# Patient Record
Sex: MALE | Race: WHITE | NOT HISPANIC OR LATINO | Employment: STUDENT | URBAN - METROPOLITAN AREA
[De-identification: names, ages, dates, MRNs, and addresses within clinical notes are randomized per-mention and may not be internally consistent; named-entity substitution may affect disease eponyms.]

---
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
## 2018-03-30 ENCOUNTER — OFFICE VISIT (OUTPATIENT)
Dept: FAMILY MEDICINE | Facility: OTHER | Age: 18
End: 2018-03-30
Payer: COMMERCIAL

## 2018-03-30 VITALS
WEIGHT: 175 LBS | HEIGHT: 74 IN | RESPIRATION RATE: 20 BRPM | SYSTOLIC BLOOD PRESSURE: 104 MMHG | OXYGEN SATURATION: 96 % | DIASTOLIC BLOOD PRESSURE: 76 MMHG | BODY MASS INDEX: 22.46 KG/M2 | TEMPERATURE: 98.2 F | HEART RATE: 100 BPM

## 2018-03-30 DIAGNOSIS — J01.90 ACUTE SINUSITIS WITH SYMPTOMS > 10 DAYS: Primary | ICD-10-CM

## 2018-03-30 PROCEDURE — 99213 OFFICE O/P EST LOW 20 MIN: CPT | Performed by: PHYSICIAN ASSISTANT

## 2018-03-30 RX ORDER — AMOXICILLIN 875 MG
875 TABLET ORAL 2 TIMES DAILY
Qty: 20 TABLET | Refills: 0 | Status: SHIPPED | OUTPATIENT
Start: 2018-03-30 | End: 2018-04-02 | Stop reason: ALTCHOICE

## 2018-03-30 ASSESSMENT — PAIN SCALES - GENERAL: PAINLEVEL: MILD PAIN (2)

## 2018-03-30 NOTE — MR AVS SNAPSHOT
After Visit Summary   3/30/2018    Guillermo Harris    MRN: [id number]           Patient Information     Date Of Birth          2000        Visit Information        Provider Department      3/30/2018 9:15 AM Lorraine Mendez PA-C [facility]        Today's Diagnoses     Acute sinusitis with symptoms > 10 days    -  1      Care Instructions    - Start Amoxicillin twice a day for 10 days   - Recommend - Sudafed or Nasal                     Sinusitis           What is sinusitis?   Sinusitis is swollen, infected linings of the sinuses. The sinuses are hollow spaces in the bones of your face and skull. They connect with the nose through small openings. Like the nose, their linings make mucus.   How does it occur?   Sinusitis occurs when the sinus linings become infected. The passageways from the sinuses to the nose are very narrow. Swelling and mucus may block the passageways. This leads to pressure changes in the sinuses that can be painful.   A number of things can cause swelling and sinusitis. Most often it's allergens (things that cause allergies, like pollen and mold) and viruses, such as viruses that cause the common cold. Whether the cause is allergies or a virus, the sinus linings can swell. When swelling causes the sinus passageway to swell shut, bacteria, viruses, and even fungus can be trapped in the sinuses and cause a sinus infection.   If your nasal bones have been injured or are deformed, causing partial blockage of the sinus openings, you are more likely to get sinusitis.   What are the symptoms?   Symptoms include:   feeling of fullness or pressure in your head   a headache that is most painful when you first wake up in the morning or when you bend your head down or forward   pain above or below your eyes   aching in the upper jaw and teeth   runny or stuffy nose   cough, especially at night   fluid draining down the back of your throat (postnasal  drainage)   sore throat in the morning or evening.   How is it diagnosed?   Your healthcare provider will ask about your symptoms and will examine you. You may have an X-ray to look for swelling, fluid, or small benign growths (polyps) in the sinuses.   How is it treated?   Decongestants may help. They may be nonprescription or prescription. They are available as liquids, pills, and nose sprays.   Your healthcare provider may prescribe an antibiotic. In some cases you may need to take decongestants and antibiotics for several weeks.   You may need nonprescription medicine for pain, such as acetaminophen or ibuprofen. Check with your healthcare provider before you give any medicine that contains aspirin or salicylates to a child or teen. This includes medicines like baby aspirin, some cold medicines, and Pepto Bismol. Children and teens who take aspirin are at risk for a serious illness called Reye's syndrome. Ibuprofen is an NSAID. Nonsteroidal anti-inflammatory medicines (NSAIDs) may cause stomach bleeding and other problems. These risks increase with age. Read the label and take as directed. Unless recommended by your healthcare provider, do not take NSAIDs for more than 10 days for any reason.   If you have chronic or repeated sinus infections, allergies may be the cause. Your healthcare provider may prescribe antihistamine tablets or prescription nasal sprays (steroids or cromolyn) to treat the allergies.   If you have chronic, severe sinusitis that does not respond to treatment with medicines, surgery may be done. The surgeon can create an extra or enlarged passageway in the wall of the sinus cavity. This allows the sinuses to drain more easily through the nasal passages. This should help them stay free of infection.   How long will the effects last?   Symptoms may get better gradually over 3 to 10 days. Depending on what caused the sinusitis and how severe it is, it may last for days or weeks. The symptoms may  come back if you do not finish all of your antibiotic.   How can I take care of myself?   Follow your healthcare provider's instructions.   If you are taking an antibiotic, take all of it as directed by your provider. If you stop taking the medicine when your symptoms are gone but before you have taken all of the medicine, symptoms may come back.   Avoid tobacco smoke.   If you have allergies, take care to avoid the things you are allergic to, such as animal dander.   Add moisture to the air with a humidifier or a vaporizer, unless you have mold allergy (mold may grow in your vaporizer).   Inhale steam from a basin of hot water or shower to help open your sinuses and relieve pain.   Use saline nasal sprays to help wash out nasal passages and clear some mucus from the airways.   Use decongestants as directed on the label or by your provider.   If you are using a nonprescription nasal-spray decongestant, generally you should not use it for more than 3 days. After 3 days it may cause your symptoms to get worse. Ask your healthcare provider if it is OK for you to use a nasal spray decongestant longer than this.   Get plenty of rest.   Drink more fluids to keep the mucus as thin as possible so your sinuses can drain more easily.   Put warm compresses on painful areas.   Take antibiotics as prescribed. Use all of the medicine, even after you feel better. Some sinus infections require 2 to 4 weeks of antibiotic treatment.   See your healthcare provider if the pain lasts for several days or gets worse.   If the sinus areas above or below your eyes are swollen or bulging, see your healthcare provider right away. This symptom may mean that the infection is spreading. A spreading infection can affect other parts of your body--even the brain--and needs to be treated promptly.   How can I help prevent sinusitis?   Treat your colds and allergies promptly. Use decongestants as soon as you start having symptoms.   Do not smoke and  stay away from secondhand smoke.   Drink lots of fluids to keep the mucus thin.   Humidify your home if the air is particularly dry.   If you have sinus infections often, consider having allergy tests.   If sinusitis continues to be a problem despite treatment, you might need an exam by an ear, nose, and throat doctor (called an ENT or otolaryngologist). The specialist will check for polyps or a deformed bone that may be blocking your sinuses.     Published by "[employer].  This content is reviewed periodically and is subject to change as new health information becomes available. The information is intended to inform and educate and is not a replacement for medical evaluation, advice, diagnosis or treatment by a healthcare professional.   Developed by "[employer].   ? 2010 "[employer] and/or its affiliates. All Rights Reserved.   Copyright   Clinical Reference Systems 2011  Adult Health Advisor                      Follow-ups after your visit        Follow-up notes from your care team     Return if symptoms worsen or fail to improve.      Who to contact     If you have questions or need follow up information about today's clinic visit or your schedule please contact [facility] directly at [phone].  Normal or non-critical lab and imaging results will be communicated to you by [employer], letter or phone within 4 business days after the clinic has received the results. If you do not hear from us within 7 days, please contact the clinic through [employer] or phone. If you have a critical or abnormal lab result, we will notify you by phone as soon as possible.  Submit refill requests through [employer] or call your pharmacy and they will forward the refill request to us. Please allow 3 business days for your refill to be completed.          Additional Information About Your Visit        [employer] Information     [employer] gives you secure access to your electronic health record. If you see a primary care  "provider, you can also send messages to your care team and make appointments. If you have questions, please call your primary care clinic.  If you do not have a primary care provider, please call [phone] and they will assist you.        Care EveryWhere ID     This is your Care EveryWhere ID. This could be used by other organizations to access your [facility] medical records  [id number]        Your Vitals Were     Pulse Temperature Respirations Height Pulse Oximetry BMI (Body Mass Index)    100 98.2  F (36.8  C) (Oral) 20 6' 2.09\" (1.882 m) 96% 22.41 kg/m2       Blood Pressure from Last 3 Encounters:   03/30/18 104/76   03/24/17 108/78   03/03/17 100/62    Weight from Last 3 Encounters:   03/30/18 175 lb (79.4 kg) (77 %)*   03/24/17 168 lb 8 oz (76.4 kg) (75 %)*   03/03/17 172 lb 8 oz (78.2 kg) (79 %)*     * Growth percentiles are based on [facility] 2-20 Years data.              Today, you had the following     No orders found for display         Today's Medication Changes          These changes are accurate as of 3/30/18  9:50 AM.  If you have any questions, ask your nurse or doctor.               Start taking these medicines.        Dose/Directions    amoxicillin 875 MG tablet   Commonly known as:  AMOXIL   Used for:  Acute sinusitis with symptoms > 10 days   Started by:  Lorraine Mendez PA-C        Dose:  875 mg   Take 1 tablet (875 mg) by mouth 2 times daily   Quantity:  20 tablet   Refills:  0            Where to get your medicines      These medications were [place] [facility] [place] - [address]  [address] , [facility] [address]     Phone:  [phone]     amoxicillin 875 MG tablet                Primary Care Provider Office Phone # Fax #    Stephie Lowry [phone]       [address]  [facility] [address]        Equal Access to Services     CRISTI RUELAS AH: Hadrg Curiel, waaxda luqadaha, qaybta conniealjazmin de leon, " krystina willsvicki cast'aan ah. So [facility] [phone].    ATENCIÓN: Si habla rivka, tiene a frank disposición servicios gratuitos de asistencia lingüística. Austen al [phone].    We comply with applicable federal civil rights laws and [place] laws. We do not discriminate on the basis of race, color, national origin, age, disability, sex, sexual orientation, or gender identity.            Thank you!     Thank you for choosing [facility]  for your care. Our goal is always to provide you with excellent care. Hearing back from our patients is one way we can continue to improve our services. Please take a few minutes to complete the written survey that you may receive in the mail after your visit with us. Thank you!             Your Updated Medication List - Protect others around you: Learn how to safely use, store and throw away your medicines at www.disposemymeds.org.          This list is accurate as of 3/30/18  9:50 AM.  Always use your most recent med list.                   Brand Name Dispense Instructions for use Diagnosis    amoxicillin 875 MG tablet    AMOXIL    20 tablet    Take 1 tablet (875 mg) by mouth 2 times daily    Acute sinusitis with symptoms > 10 days

## 2018-03-30 NOTE — PATIENT INSTRUCTIONS
- Start Amoxicillin twice a day for 10 days   - Recommend - Sudafed or Nasal                     Sinusitis           What is sinusitis?   Sinusitis is swollen, infected linings of the sinuses. The sinuses are hollow spaces in the bones of your face and skull. They connect with the nose through small openings. Like the nose, their linings make mucus.   How does it occur?   Sinusitis occurs when the sinus linings become infected. The passageways from the sinuses to the nose are very narrow. Swelling and mucus may block the passageways. This leads to pressure changes in the sinuses that can be painful.   A number of things can cause swelling and sinusitis. Most often it's allergens (things that cause allergies, like pollen and mold) and viruses, such as viruses that cause the common cold. Whether the cause is allergies or a virus, the sinus linings can swell. When swelling causes the sinus passageway to swell shut, bacteria, viruses, and even fungus can be trapped in the sinuses and cause a sinus infection.   If your nasal bones have been injured or are deformed, causing partial blockage of the sinus openings, you are more likely to get sinusitis.   What are the symptoms?   Symptoms include:   feeling of fullness or pressure in your head   a headache that is most painful when you first wake up in the morning or when you bend your head down or forward   pain above or below your eyes   aching in the upper jaw and teeth   runny or stuffy nose   cough, especially at night   fluid draining down the back of your throat (postnasal drainage)   sore throat in the morning or evening.   How is it diagnosed?   Your healthcare provider will ask about your symptoms and will examine you. You may have an X-ray to look for swelling, fluid, or small benign growths (polyps) in the sinuses.   How is it treated?   Decongestants may help. They may be nonprescription or prescription. They are available as liquids, pills, and nose sprays.    Your healthcare provider may prescribe an antibiotic. In some cases you may need to take decongestants and antibiotics for several weeks.   You may need nonprescription medicine for pain, such as acetaminophen or ibuprofen. Check with your healthcare provider before you give any medicine that contains aspirin or salicylates to a child or teen. This includes medicines like baby aspirin, some cold medicines, and Pepto Bismol. Children and teens who take aspirin are at risk for a serious illness called Reye's syndrome. Ibuprofen is an NSAID. Nonsteroidal anti-inflammatory medicines (NSAIDs) may cause stomach bleeding and other problems. These risks increase with age. Read the label and take as directed. Unless recommended by your healthcare provider, do not take NSAIDs for more than 10 days for any reason.   If you have chronic or repeated sinus infections, allergies may be the cause. Your healthcare provider may prescribe antihistamine tablets or prescription nasal sprays (steroids or cromolyn) to treat the allergies.   If you have chronic, severe sinusitis that does not respond to treatment with medicines, surgery may be done. The surgeon can create an extra or enlarged passageway in the wall of the sinus cavity. This allows the sinuses to drain more easily through the nasal passages. This should help them stay free of infection.   How long will the effects last?   Symptoms may get better gradually over 3 to 10 days. Depending on what caused the sinusitis and how severe it is, it may last for days or weeks. The symptoms may come back if you do not finish all of your antibiotic.   How can I take care of myself?   Follow your healthcare provider's instructions.   If you are taking an antibiotic, take all of it as directed by your provider. If you stop taking the medicine when your symptoms are gone but before you have taken all of the medicine, symptoms may come back.   Avoid tobacco smoke.   If you have allergies,  take care to avoid the things you are allergic to, such as animal dander.   Add moisture to the air with a humidifier or a vaporizer, unless you have mold allergy (mold may grow in your vaporizer).   Inhale steam from a basin of hot water or shower to help open your sinuses and relieve pain.   Use saline nasal sprays to help wash out nasal passages and clear some mucus from the airways.   Use decongestants as directed on the label or by your provider.   If you are using a nonprescription nasal-spray decongestant, generally you should not use it for more than 3 days. After 3 days it may cause your symptoms to get worse. Ask your healthcare provider if it is OK for you to use a nasal spray decongestant longer than this.   Get plenty of rest.   Drink more fluids to keep the mucus as thin as possible so your sinuses can drain more easily.   Put warm compresses on painful areas.   Take antibiotics as prescribed. Use all of the medicine, even after you feel better. Some sinus infections require 2 to 4 weeks of antibiotic treatment.   See your healthcare provider if the pain lasts for several days or gets worse.   If the sinus areas above or below your eyes are swollen or bulging, see your healthcare provider right away. This symptom may mean that the infection is spreading. A spreading infection can affect other parts of your body--even the brain--and needs to be treated promptly.   How can I help prevent sinusitis?   Treat your colds and allergies promptly. Use decongestants as soon as you start having symptoms.   Do not smoke and stay away from secondhand smoke.   Drink lots of fluids to keep the mucus thin.   Humidify your home if the air is particularly dry.   If you have sinus infections often, consider having allergy tests.   If sinusitis continues to be a problem despite treatment, you might need an exam by an ear, nose, and throat doctor (called an ENT or otolaryngologist). The specialist will check for polyps or  a deformed bone that may be blocking your sinuses.     Published by [employer].  This content is reviewed periodically and is subject to change as new health information becomes available. The information is intended to inform and educate and is not a replacement for medical evaluation, advice, diagnosis or treatment by a healthcare professional.   Developed by [employer].   ? 2010 [facility] and/or its affiliates. All Rights Reserved.   Copyright   Clinical Reference Systems 2011  Adult Health Advisor

## 2018-03-30 NOTE — PROGRESS NOTES
"  SUBJECTIVE:   Guillermo Harris is a 17 year old male who presents to clinic today for the following health issues:    HPI  Acute Illness   Acute illness concerns: cough/sinus  Onset: 1+ week    Fever: no    Chills/Sweats: YES- sweats    Headache (location?): YES    Sinus Pressure:YES    Conjunctivitis:  no    Ear Pain: no    Rhinorrhea: YES    Congestion: YES    Sore Throat: YES- slight     Cough: YES - mucous     Wheeze: no    Decreased Appetite: YES    Nausea: no    Vomiting: YES- from coughing     Diarrhea:  no    Dysuria/Freq.: no    Fatigue/Achiness: YES    Sick/Strep Exposure: no     Therapies Tried and outcome: mucinex, nyquil - not helpful       Problem list and histories reviewed & adjusted, as indicated.  Additional history: as documented    ROS:  Constitutional, HEENT, cardiovascular, pulmonary, gi and gu systems are negative, except as otherwise noted.    OBJECTIVE:   /76 (BP Location: Right arm, Patient Position: Chair, Cuff Size: Adult Regular)  Pulse 100  Temp 98.2  F (36.8  C) (Oral)  Resp 20  Ht 6' 2.09\" (1.882 m)  Wt 175 lb (79.4 kg)  SpO2 96%  BMI 22.41 kg/m2  Body mass index is 22.41 kg/(m^2).  GENERAL APPEARANCE: ill appearing, alert and no distress  EYES: Eyes grossly normal to inspection, PERRLA, conjunctivae and sclerae without injection or discharge, EOM intact   HENT: Bilateral ear canals without erythema or cerumen, bilateral TM's with clear serous effusion and mild injection, no bulging, nasal turbinates with severe swelling & erythema, yellow-green nasal discharge, mouth without ulcers or lesions, oropharynx mildly erythematous without edema or exudate, oral mucous membranes moist, severe bilateral frontal sinus tenderness, mild-moderate bilateral maxillary sinus tenderness  NECK: Bilateral anterior cervical adenopathy, no adenopathy in cervical or supraclavicular regions  RESP: Lungs clear to auscultation - no rales, rhonchi or wheezes   CV: Regular rates and rhythm, " normal S1 S2, no S3 or S4, no murmur, click or rub  MS: No musculoskeletal defects are noted and gait is age appropriate without ataxia   SKIN: No suspicious lesions or rashes, hydration status appears adeuqate with normal skin turgor   PSYCH: Alert and oriented x3; speech- coherent , normal rate and volume; able to articulate logical thoughts, able to abstract reason, no tangential thoughts, no hallucinations or delusions, mentation appears normal, Mood is euthymic. Affect is appropriate for this mood state and bright. Thought content is free of suicidal ideation, hallucinations, and delusions. Dress is adequate and upkept. Eye contact is good during conversation.       Diagnostic Test Results:  none     ASSESSMENT/PLAN:       ICD-10-CM    1. Acute sinusitis with symptoms > 10 days J01.90 amoxicillin (AMOXIL) 875 MG tablet     - Due to severity of symptoms and duration, recommend treatment  - Discussed antibiotic use, duration, and side effects  - Recommend Sudafed, nasal saline, rest, and fluids   - Hand out given    The patient indicates understanding of these issues and agrees with the plan.    Follow up: ANNETTE Mendez PA-C  [facility]

## 2018-03-30 NOTE — NURSING NOTE
"Chief Complaint   Patient presents with     Cough       Initial /76 (BP Location: Right arm, Patient Position: Chair, Cuff Size: Adult Regular)  Pulse 100  Temp 98.2  F (36.8  C) (Oral)  Resp 20  Ht 6' 2.09\" (1.882 m)  Wt 175 lb (79.4 kg)  SpO2 96%  BMI 22.41 kg/m2 Estimated body mass index is 22.41 kg/(m^2) as calculated from the following:    Height as of this encounter: 6' 2.09\" (1.882 m).    Weight as of this encounter: 175 lb (79.4 kg).  Medication Reconciliation: complete  "

## 2018-04-02 ENCOUNTER — OFFICE VISIT (OUTPATIENT)
Dept: FAMILY MEDICINE | Facility: OTHER | Age: 18
End: 2018-04-02
Payer: COMMERCIAL

## 2018-04-02 VITALS
SYSTOLIC BLOOD PRESSURE: 100 MMHG | WEIGHT: 175 LBS | HEART RATE: 92 BPM | OXYGEN SATURATION: 94 % | BODY MASS INDEX: 22.41 KG/M2 | TEMPERATURE: 98.4 F | RESPIRATION RATE: 16 BRPM | DIASTOLIC BLOOD PRESSURE: 78 MMHG

## 2018-04-02 DIAGNOSIS — H66.001 ACUTE SUPPURATIVE OTITIS MEDIA OF RIGHT EAR WITHOUT SPONTANEOUS RUPTURE OF TYMPANIC MEMBRANE, RECURRENCE NOT SPECIFIED: Primary | ICD-10-CM

## 2018-04-02 PROCEDURE — 99213 OFFICE O/P EST LOW 20 MIN: CPT | Performed by: PHYSICIAN ASSISTANT

## 2018-04-02 RX ORDER — PSEUDOEPHEDRINE HCL 120 MG/1
120 TABLET, FILM COATED, EXTENDED RELEASE ORAL EVERY 12 HOURS
Qty: 28 TABLET | Refills: 0 | Status: SHIPPED | OUTPATIENT
Start: 2018-04-02 | End: 2020-12-16

## 2018-04-02 NOTE — MR AVS SNAPSHOT
After Visit Summary   4/2/2018    Guillermo Harris    MRN: [id number]           Patient Information     Date Of Birth          2000        Visit Information        Provider Department      4/2/2018 11:30 AM Lorraine Mendez PA-C [facility]        Today's Diagnoses     Acute suppurative otitis media of right ear without spontaneous rupture of tympanic membrane, recurrence not specified    -  1      Care Instructions    - Stop Amoxicillin  - Start Augmentin   - Start Sudafed 1-2x per day for the next 4-5 days               Follow-ups after your visit        Who to contact     If you have questions or need follow up information about today's clinic visit or your schedule please contact [facility] directly at [phone].  Normal or non-critical lab and imaging results will be communicated to you by MyChart, letter or phone within 4 business days after the clinic has received the results. If you do not hear from us within 7 days, please contact the clinic through [employer] or phone. If you have a critical or abnormal lab result, we will notify you by phone as soon as possible.  Submit refill requests through [employer] or call your pharmacy and they will forward the refill request to us. Please allow 3 business days for your refill to be completed.          Additional Information About Your Visit        MyChart Information     [employer] gives you secure access to your electronic health record. If you see a primary care provider, you can also send messages to your care team and make appointments. If you have questions, please call your primary care clinic.  If you do not have a primary care provider, please call [phone] and they will assist you.        Care EveryWhere ID     This is your Care EveryWhere ID. This could be used by other organizations to access your [facility] medical records  [id number]        Your Vitals Were     Pulse Temperature  Respirations Pulse Oximetry BMI (Body Mass Index)       92 98.4  F (36.9  C) (Oral) 16 94% 22.41 kg/m2        Blood Pressure from Last 3 Encounters:   04/02/18 100/78   03/30/18 104/76   03/24/17 108/78    Weight from Last 3 Encounters:   04/02/18 175 lb (79.4 kg) (77 %)*   03/30/18 175 lb (79.4 kg) (77 %)*   03/24/17 168 lb 8 oz (76.4 kg) (75 %)*     * Growth percentiles are based on [facility] 2-20 Years data.              Today, you had the following     No orders found for display         Today's Medication Changes          These changes are accurate as of 4/2/18 12:06 PM.  If you have any questions, ask your nurse or doctor.               Start taking these medicines.        Dose/Directions    amoxicillin-clavulanate 875-125 MG per tablet   Commonly known as:  AUGMENTIN   Used for:  Acute suppurative otitis media of right ear without spontaneous rupture of tympanic membrane, recurrence not specified   Started by:  Lorraine Mendez PA-C        Dose:  1 tablet   Take 1 tablet by mouth 2 times daily   Quantity:  20 tablet   Refills:  0       pseudoePHEDrine 120 MG 12 hr tablet   Commonly known as:  SUDAFED   Used for:  Acute suppurative otitis media of right ear without spontaneous rupture of tympanic membrane, recurrence not specified   Started by:  Lorraine Mendez PA-C        Dose:  120 mg   Take 1 tablet (120 mg) by mouth every 12 hours   Quantity:  28 tablet   Refills:  0         Stop taking these medicines if you haven't already. Please contact your care team if you have questions.     amoxicillin 875 MG tablet   Commonly known as:  AMOXIL   Stopped by:  Lorraine Mendez PA-C                Where to get your medicines      These medications were sent to [facility] [place], [place] - [address]  [address] [address]     Phone:  [phone]     amoxicillin-clavulanate 875-125 MG per tablet         Some of these will need a paper  prescription and others can be bought over the counter.  Ask your nurse if you have questions.     Bring a paper prescription for each of these medications     pseudoePHEDrine 120 MG 12 hr tablet                Primary Care Provider Office Phone # Fax #    Stephie Lowry -[phone]       [address] [place] [address] [address]  [facility] [address]        Equal Access to Services     [facility]: Hadii aad ku hadasho Soomaali, waaxda luqadaha, qaybta kaalmada adeegyada, waxay idiin hayaan adeeg kharash lapamela . So [facility] [phone].    ATENCIÓN: Si habla español, tiene a frank disposición servicios gratuitos de asistencia lingüística. Llame al [phone].    We comply with applicable federal civil rights laws and [place] laws. We do not discriminate on the basis of race, color, national origin, age, disability, sex, sexual orientation, or gender identity.            Thank you!     Thank you for choosing [facility]  for your care. Our goal is always to provide you with excellent care. Hearing back from our patients is one way we can continue to improve our services. Please take a few minutes to complete the written survey that you may receive in the mail after your visit with us. Thank you!             Your Updated Medication List - Protect others around you: Learn how to safely use, store and throw away your medicines at www.disposemymeds.org.          This list is accurate as of 4/2/18 12:06 PM.  Always use your most recent med list.                   Brand Name Dispense Instructions for use Diagnosis    amoxicillin-clavulanate 875-125 MG per tablet    AUGMENTIN    20 tablet    Take 1 tablet by mouth 2 times daily    Acute suppurative otitis media of right ear without spontaneous rupture of tympanic membrane, recurrence not specified       pseudoePHEDrine 120 MG 12 hr tablet    SUDAFED    28 tablet    Take 1 tablet (120 mg) by mouth every 12 hours    Acute suppurative otitis media of right ear  without spontaneous rupture of tympanic membrane, recurrence not specified

## 2018-04-02 NOTE — PROGRESS NOTES
SUBJECTIVE:   Guillermo Harris is a 17 year old male who presents to clinic today for the following health issues:    HPI     Pt states he is having issues since starting the abx from his last appt, states he feels like there is water or fullness in his ear, hard to hear out of it.  Pain in the ear can be sharp when happening  States other sx's are clearing but still having some cough and nasal drainage.       Seen on 3/30/18 (3 days ago)   - Sinus infection, given Amoxicillin 875 mg BID for 10 days         Problem list and histories reviewed & adjusted, as indicated.  Additional history: as documented    ROS:  Constitutional, HEENT, cardiovascular, pulmonary, gi and gu systems are negative, except as otherwise noted.    OBJECTIVE:   /78 (BP Location: Left arm, Patient Position: Chair, Cuff Size: Adult Regular)  Pulse 92  Temp 98.4  F (36.9  C) (Oral)  Resp 16  Wt 175 lb (79.4 kg)  SpO2 94%  BMI 22.41 kg/m2  Body mass index is 22.41 kg/(m^2).  GENERAL APPEARANCE: mildly ill appearing, alert and no distress  EYES: Eyes grossly normal to inspection, PERRLA, conjunctivae and sclerae without injection or discharge, EOM intact   HENT: Bilateral ear canals without erythema or cerumen, right TM erythematous with purulent effusion, injection, and bulging, left TM with small serous effusion, no injection or bulging, nasal turbinates with mild swelling & erythema, clear nasal discharge, mouth without ulcers or lesions, oropharynx mildly erythematous without edema or exudate, oral mucous membranes moist, no sinus tenderness   NECK: No adenopathy in cervical or supraclavicular regions  RESP: Lungs clear to auscultation - no rales, rhonchi or wheezes   CV: Regular rates and rhythm, normal S1 S2, no S3 or S4, no murmur, click or rub  MS: No musculoskeletal defects are noted and gait is age appropriate without ataxia   SKIN: No suspicious lesions or rashes, hydration status appears adeuqate with normal skin turgor    PSYCH: Alert and oriented x3; speech- coherent , normal rate and volume; able to articulate logical thoughts, able to abstract reason, no tangential thoughts, no hallucinations or delusions, mentation appears normal, Mood is euthymic. Affect is appropriate for this mood state and bright. Thought content is free of suicidal ideation, hallucinations, and delusions. Dress is adequate and upkept. Eye contact is good during conversation.       Diagnostic Test Results:  none     ASSESSMENT/PLAN:       ICD-10-CM    1. Acute suppurative otitis media of right ear without spontaneous rupture of tympanic membrane, recurrence not specified H66.001 amoxicillin-clavulanate (AUGMENTIN) 875-125 MG per tablet     pseudoePHEDrine (SUDAFED) 120 MG 12 hr tablet     - Continued and worsening right otitis media despite Amoxicillin 375 mg for 3 complete days (+1 one dose this AM)   - Suspect not enough coverage   - Will have patient stop Amoxicillin and start Augmentin along with Sudafed BID, reviewed both use and side effects   - Should give 48-72 hours, if still doesn't improve will switch to Bactrim, instructed patient to call for this   - Encouraged rest and fluids     The patient indicates understanding of these issues and agrees with the plan.    Follow up: ANNETTE Mendez PA-C  [facility]

## 2018-04-02 NOTE — NURSING NOTE
"No chief complaint on file.      Initial /78 (BP Location: Left arm, Patient Position: Chair, Cuff Size: Adult Regular)  Pulse 92  Temp 98.4  F (36.9  C) (Oral)  Resp 16  Wt 175 lb (79.4 kg)  SpO2 94%  BMI 22.41 kg/m2 Estimated body mass index is 22.41 kg/(m^2) as calculated from the following:    Height as of 3/30/18: 6' 2.09\" (1.882 m).    Weight as of this encounter: 175 lb (79.4 kg).  Medication Reconciliation: complete  "

## 2018-10-29 ENCOUNTER — TELEPHONE (OUTPATIENT)
Dept: FAMILY MEDICINE | Facility: CLINIC | Age: 18
End: 2018-10-29

## 2018-10-29 DIAGNOSIS — L98.9 SKIN LESION: Primary | ICD-10-CM

## 2018-10-29 NOTE — TELEPHONE ENCOUNTER
Huddled with WENDI, dermatology referral placed. Number given to mom to schedule      Nmaita Bueno, RN, BSN

## 2018-10-29 NOTE — TELEPHONE ENCOUNTER
Reason for Call:  Other     Detailed comments: pt mother states pt has this bump on his cheek that has been there for years. Pt mother states sometimes able to squeeze stuff out of it but it never goes away really. Pt mother states wondering if there is someone Jessica Ordoñez recommends he see in clinic or if theres a dermatologist she recommends. Pt mother states pt seems to be bothered by it more now because he shaves and its in the way.  Please advise     Phone Number Patient can be reached at: Home number on file [phone] (home)    Best Time: ANY    Can we leave a detailed message on this number? YES    Call taken on 10/29/2018 at 1:10 PM by Nelly Whitney

## 2019-01-08 ENCOUNTER — OFFICE VISIT (OUTPATIENT)
Dept: DERMATOLOGY | Facility: CLINIC | Age: 19
End: 2019-01-08
Attending: NURSE PRACTITIONER
Payer: COMMERCIAL

## 2019-01-08 DIAGNOSIS — L57.8 SUN-DAMAGED SKIN: ICD-10-CM

## 2019-01-08 DIAGNOSIS — L81.4 SOLAR LENTIGO: ICD-10-CM

## 2019-01-08 DIAGNOSIS — D22.9 MULTIPLE BENIGN NEVI: ICD-10-CM

## 2019-01-08 DIAGNOSIS — L70.0 ACNE VULGARIS: ICD-10-CM

## 2019-01-08 DIAGNOSIS — D48.5 NEOPLASM OF UNCERTAIN BEHAVIOR OF SKIN: Primary | ICD-10-CM

## 2019-01-08 PROCEDURE — 11102 TANGNTL BX SKIN SINGLE LES: CPT | Performed by: DERMATOLOGY

## 2019-01-08 PROCEDURE — 99202 OFFICE O/P NEW SF 15 MIN: CPT | Mod: 25 | Performed by: DERMATOLOGY

## 2019-01-08 PROCEDURE — 88305 TISSUE EXAM BY PATHOLOGIST: CPT | Mod: TC | Performed by: DERMATOLOGY

## 2019-01-08 ASSESSMENT — PAIN SCALES - GENERAL: PAINLEVEL: NO PAIN (0)

## 2019-01-08 NOTE — PROGRESS NOTES
[facility] Dermatology Note      Dermatology Problem List:  1. NUB, left cheek -s/p biopsy 1/8/2019    Encounter Date: Jan 8, 2019    CC:  Chief Complaint   Patient presents with     Lesion     waist up exam - lesions on shoulder and lesion on left cheek         History of Present Illness:  Mr. Guillermo Harris is a 18 year old male who presents as a referral from Jessica Ordoñez CNP for a lesion on the shoulder and one on the cheek. Patient is also concerned about a lesion on the left cheek.  The spot on his left cheek appeared about 2 years ago. When he picks at it, it bleeds. It seems to have a center core to it that he keeps attempting to pop out. Lesion on the back has been present for as long as he can remember, no changes. No personal or family history of skin cancer. No other concerns addressed today.    Past Medical History:   Patient Active Problem List   Diagnosis     Seasonal allergies     Sprain and strain of hip and thigh     Fracture of finger of left hand- left, S-H 1, mid 5th phalange     Past Medical History:   Diagnosis Date     Febrile seizure (H)      Seasonal allergies     Spring     Past Surgical History:   Procedure Laterality Date     NO HISTORY OF SURGERY         Social History:  Patient reports that  has never smoked. he has never used smokeless tobacco. He reports that he drinks alcohol. He reports that he does not use drugs. Patient is currently a student.     Family History:  Family History   Problem Relation Age of Onset     Cardiovascular Paternal Grandfather      Lipids Paternal Uncle      Hypertension Maternal Grandfather      Cancer Maternal Aunt         skin cancer.     Other Cancer Maternal Grandmother        Medications:  Current Outpatient Medications   Medication Sig Dispense Refill     amoxicillin-clavulanate (AUGMENTIN) 875-125 MG per tablet Take 1 tablet by mouth 2 times daily 20 tablet 0     pseudoePHEDrine (SUDAFED) 120 MG 12 hr tablet Take 1 tablet (120  mg) by mouth every 12 hours 28 tablet 0       Allergies   Allergen Reactions     No Known Drug Allergies      Seasonal Allergies      pollen       Review of Systems:  -Constitutional: Patient is otherwise feeling well, in usual state of health.   -Skin: As above in HPI. No additional skin concerns.    Physical exam:  Vitals: There were no vitals taken for this visit.  GEN: This is a well developed, well-nourished male in no acute distress, in a pleasant mood.    SKIN: Waist-up skin, which includes the head/face, neck, both arms, chest, back, abdomen, digits and/or nails was examined.  - Scattered brown macules on sun exposed areas.  - 4 mm pink somewhat pearly papule, on dermoscopy there is a network of vessels, some are arborizing   - Mild greasy scale throughout the scalp   - Multiple regular brown pigmented macules and papules are identified on the trunk. Several nevi on trunk have congenital appearance - either papular and with cobblestone appearance or red-brown checkerboard on dermoscopy.  - inflammatory papules on the back  - No other lesions of concern on areas examined.               Impression/Plan:  1. Sun damaged skin with solar lentigines    Recommend sunscreens SPF #30 or greater, protective clothing and avoidance of tanning beds.    2. Seborrheic dermatitis    Recommended he use Head and Shoulders and alternate with DHS with zinc.     3. Multiple clinically benign nevi    No further intervention required. Patient to report changes.     Patient reassured of the benign nature of these lesions.    4. Acne vulgaris/folliculitis of the back:    Recommended OTC BPO wash daily in the shower.     5. NUB, 4 mm pink somewhat pearly papule, on dermoscopy there is a network of vessels, some are arborizing left cheek. Ddx: dermal nevus vs BCC    Shave biopsy:  After discussion of benefits and risks including but not limited to bleeding/bruising, pain/swelling, infection, scar, incomplete removal, nerve  damage/numbness, recurrence, and non-diagnostic biopsy, written consent, verbal consent and photographs were obtained. Time-out was performed. The area was cleaned with isopropyl alcohol. 0.5ml of 1% lidocaine with 1:100,000 epinephrine was injected to obtain adequate anesthesia. A shave biopsy was performed. Hemostasis was achieved with aluminium chloride. Vaseline and a sterile dressing were applied. The patient tolerated the procedure and no complications were noted. The patient was provided with verbal and written post care instructions.There was keratin debris at the base of the biopsy, so lesion may also be top of EIC.     CC MISSAEL Crook CNP on close of this encounter.  Follow up pending biopsy results.     Staff Involved:  Scribe/Staff    Scribe Disclosure  I, Taisha Castro, am serving as a scribe to document services personally performed by Dr. Raven Steve MD, based on data collection and the provider's statements to me.     Provider Disclosure:   The documentation recorded by the scribe accurately reflects the services I personally performed and the decisions made by me.    Raven Steve MD    Department of Dermatology  [facility]: Phone: [phone], Fax:[phone]  [facility]: Phone: [phone], Fax: [phone]

## 2019-01-08 NOTE — PATIENT INSTRUCTIONS
Try using Head and Shoulders shampoo alternating with DHS with zinc.     Start over-the-counter benzoyl peroxide 10% wash as a face wash.  If 10% is too irritating you can use the 5%. ([employer] makes this product. It is available here at the pharmacy or at [employer]). This medication can bleach your towels and clothing.     It is found in a purple tube in the acne aisle.             Wound Care After a Biopsy    What is a skin biopsy?  A skin biopsy allows the doctor to examine a very small piece of tissue under the microscope to determine the diagnosis and the best treatment for the skin condition. A local anesthetic (numbing medicine)  is injected with a very small needle into the skin area to be tested. A small piece of skin is taken from the area. Sometimes a suture (stitch) is used.     What are the risks of a skin biopsy?  I will experience scar, bleeding, swelling, pain, crusting and redness. I may experience incomplete removal or recurrence. Risks of this procedure are excessive bleeding, bruising, infection, nerve damage, numbness, thick (hypertrophic or keloidal) scar and non-diagnostic biopsy.    How should I care for my wound for the first 24 hours?    Keep the wound dry and covered for 24 hours    If it bleeds, hold direct pressure on the area for 15 minutes. If bleeding does not stop then go to the emergency room    Avoid strenuous exercise the first 1-2 days or as your doctor instructs you    How should I care for the wound after 24 hours?    After 24 hours, remove the bandage    You may bathe or shower as normal    If you had a scalp biopsy, you can shampoo as usual and can use shower water to clean the biopsy site daily    Clean the wound twice a day with gentle soap and water    Do not scrub, be gentle    Apply white petroleum/Vaseline after cleaning the wound with a cotton swab or a clean finger, and keep the site covered with a Bandaid /bandage. Bandages are not necessary with a scalp biopsy    If  you are unable to cover the site with a Bandaid /bandage, re-apply ointment 2-3 times a day to keep the site moist. Moisture will help with healing    Avoid strenuous activity for first 1-2 days    Avoid lakes, rivers, pools, and oceans until the stitches are removed or the site is healed    How do I clean my wound?    Wash hands thoroughly with soap or use hand  before all wound care    Clean the wound with gentle soap and water    Apply white petroleum/Vaseline  to wound after it is clean    Replace the Bandaid /bandage to keep the wound covered for the first few days or as instructed by your doctor    If you had a scalp biopsy, warm shower water to the area on a daily basis should suffice    What should I use to clean my wound?     Cotton-tipped applicators (Qtips )    White petroleum jelly (Vaseline ). Use a clean new container and use Q-tips to apply.    Bandaids   as needed    Gentle soap     How should I care for my wound long term?    Do not get your wound dirty    Keep up with wound care for one week or until the area is healed.    A small scab will form and fall off by itself when the area is completely healed. The area will be red and will become pink in color as it heals. Sun protection is very important for how your scar will turn out. Sunscreen with an SPF 30 or greater is recommended once the area is healed.    You should have some soreness but it should be mild and slowly go away over several days. Talk to your doctor about using tylenol for pain,    When should I call my doctor?  If you have increased:     Pain or swelling    Pus or drainage (clear or slightly yellow drainage is ok)    Temperature over 100F    Spreading redness or warmth around wound    When will I hear about my results?  The biopsy results can take 2-3 weeks to come back. The clinic will call you with the results, send you a Sepior message, or have you schedule a follow-up clinic or phone time to discuss the results.  Contact our clinics if you do not hear from us in 3 weeks.     Who should I call with questions?    [facility]: [phone]     [facility]: [phone]    For urgent needs outside of business hours call the [facility] at [phone] and ask for the dermatology resident on call  '

## 2019-01-08 NOTE — NURSING NOTE
Guillermo Harris's goals for this visit include:   Chief Complaint   Patient presents with     Lesion     waist up exam - lesions on shoulder and lesion on left cheek       He requests these members of his care team be copied on today's visit information: NO    PCP: Stephie Lowry    Referring Provider:  MISSAEL Crook CNP  [address] [place] [address]    There were no vitals taken for this visit.    Do you need any medication refills at today's visit? NO    Shruti Solorzano, [facility]

## 2019-01-08 NOTE — LETTER
1/8/2019         RE: Guillermo Harris  [address] [address] [facility] [address]        Dear Colleague,    Thank you for referring your patient, Guillermo Harris, to the [facility]. Please see a copy of my visit note below.        [facility] Dermatology Note      Dermatology Problem List:  1. NUB, left cheek -s/p biopsy 1/8/2019    Encounter Date: Jan 8, 2019    CC:  Chief Complaint   Patient presents with     Lesion     waist up exam - lesions on shoulder and lesion on left cheek         History of Present Illness:  Mr. Guillermo Harris is a 18 year old male who presents as a referral from Jessica Ordoñez CNP for a lesion on the shoulder and one on the cheek. Patient is also concerned about a lesion on the left cheek.  The spot on his left cheek appeared about 2 years ago. When he picks at it, it bleeds. It seems to have a center core to it that he keeps attempting to pop out. Lesion on the back has been present for as long as he can remember, no changes. No personal or family history of skin cancer. No other concerns addressed today.    Past Medical History:   Patient Active Problem List   Diagnosis     Seasonal allergies     Sprain and strain of hip and thigh     Fracture of finger of left hand- left, S-H 1, mid 5th phalange     Past Medical History:   Diagnosis Date     Febrile seizure (H)      Seasonal allergies     Spring     Past Surgical History:   Procedure Laterality Date     NO HISTORY OF SURGERY         Social History:  Patient reports that  has never smoked. he has never used smokeless tobacco. He reports that he drinks alcohol. He reports that he does not use drugs. Patient is currently a student.     Family History:  Family History   Problem Relation Age of Onset     Cardiovascular Paternal Grandfather      Lipids Paternal Uncle      Hypertension Maternal Grandfather      Cancer Maternal Aunt         skin cancer.     Other Cancer Maternal Grandmother         Medications:  Current Outpatient Medications   Medication Sig Dispense Refill     amoxicillin-clavulanate (AUGMENTIN) 875-125 MG per tablet Take 1 tablet by mouth 2 times daily 20 tablet 0     pseudoePHEDrine (SUDAFED) 120 MG 12 hr tablet Take 1 tablet (120 mg) by mouth every 12 hours 28 tablet 0       Allergies   Allergen Reactions     No Known Drug Allergies      Seasonal Allergies      pollen       Review of Systems:  -Constitutional: Patient is otherwise feeling well, in usual state of health.   -Skin: As above in HPI. No additional skin concerns.    Physical exam:  Vitals: There were no vitals taken for this visit.  GEN: This is a well developed, well-nourished male in no acute distress, in a pleasant mood.    SKIN: Waist-up skin, which includes the head/face, neck, both arms, chest, back, abdomen, digits and/or nails was examined.  - Scattered brown macules on sun exposed areas.  - 4 mm pink somewhat pearly papule, on dermoscopy there is a network of vessels, some are arborizing   - Mild greasy scale throughout the scalp   - Multiple regular brown pigmented macules and papules are identified on the trunk. Several nevi on trunk have congenital appearance - either papular and with cobblestone appearance or red-brown checkerboard on dermoscopy.  - inflammatory papules on the back  - No other lesions of concern on areas examined.               Impression/Plan:  1. Sun damaged skin with solar lentigines    Recommend sunscreens SPF #30 or greater, protective clothing and avoidance of tanning beds.    2. Seborrheic dermatitis    Recommended he use Head and Shoulders and alternate with DHS with zinc.     3. Multiple clinically benign nevi    No further intervention required. Patient to report changes.     Patient reassured of the benign nature of these lesions.    4. Acne vulgaris/folliculitis of the back:    Recommended OTC BPO wash daily in the shower.     5. NUB, 4 mm pink somewhat pearly papule, on  dermoscopy there is a network of vessels, some are arborizing left cheek. Ddx: dermal nevus vs BCC    Shave biopsy:  After discussion of benefits and risks including but not limited to bleeding/bruising, pain/swelling, infection, scar, incomplete removal, nerve damage/numbness, recurrence, and non-diagnostic biopsy, written consent, verbal consent and photographs were obtained. Time-out was performed. The area was cleaned with isopropyl alcohol. 0.5ml of 1% lidocaine with 1:100,000 epinephrine was injected to obtain adequate anesthesia. A shave biopsy was performed. Hemostasis was achieved with aluminium chloride. Vaseline and a sterile dressing were applied. The patient tolerated the procedure and no complications were noted. The patient was provided with verbal and written post care instructions.There was keratin debris at the base of the biopsy, so lesion may also be top of EIC.     CC MISSAEL Crook CNP on close of this encounter.  Follow up pending biopsy results.     Staff Involved:  Scribe/Staff    Scribe Disclosure  I, Taisha Castro, am serving as a scribe to document services personally performed by Dr. Raven Steve MD, based on data collection and the provider's statements to me.     Provider Disclosure:   The documentation recorded by the scribe accurately reflects the services I personally performed and the decisions made by me.    Raven Steve MD    Department of Dermatology  [facility]: Phone: [phone], Fax:[phone]  [facility]: Phone: [phone], Fax: [phone]    ain, thank you for allowing me to participate in the care of your patient.        Sincerely,        Raven Steve MD

## 2019-01-14 ENCOUNTER — TELEPHONE (OUTPATIENT)
Dept: DERMATOLOGY | Facility: CLINIC | Age: 19
End: 2019-01-14

## 2019-01-14 LAB — COPATH REPORT: NORMAL

## 2019-01-14 NOTE — TELEPHONE ENCOUNTER
Result Notes for Dermatological path order and indications     Notes recorded by Jennifer Tripathi LPN on 1/14/2019 at 3:26 PM CST  Writer called and spoke with patient regarding biopsy results. Patient verbalized understanding. Patient stated that some of the lesion is still there and would like Dr. Kidd to look at it to see if she can removed the rest of it.  Patient is scheduled 1/16/19 at 7:30am.    Jennifer Tripathi LPN        ------    Notes recorded by Raven Cortes MD on 1/14/2019 at 11:59 AM CST  Please call patient and let him know biopsy results:    Biopsy showed two separate growths - a fibrous papule (harmless growth of connective tissue and blood vessels) and a small cyst underneath (a collection or buildup of dead skin).      It is possible that the cyst goes deeper underneath the skin than the biopsy did, so this part may grow back. Should he develop a bothersome lump underneath the skin, instruct him to call for a visit to discuss further.  Otherwise, there is nothing that needs to be done.    Raven Cortes MD    Department of Dermatology  [facility]: Phone: [phone], Fax:[phone]  [facility]: Phone: [phone], Fax: [phone]     Dermatological path order and indications   Order: [id number]   Status:  Final result   Visible to patient:  Yes (MyChart) Dx:  Neoplasm of uncertain behavior of skin   Component 6d ago   Copath Report Patient Name: LILIAN POOLE   MR#: [id number]   Specimen #:    Collected: 1/8/2019   Received: 1/9/2019   Reported: 1/14/2019 10:59   Ordering Phy(s): RAVEN CORTES     For improved result formatting, select 'View Enhanced Report Format' under    Linked Documents section.     SPECIMEN(S):   Skin, left cheek     FINAL DIAGNOSIS:   Skin, left cheek:   - Fibrous papule - (see comment)               Jennifer  Deuce, TONYA

## 2019-01-16 ENCOUNTER — OFFICE VISIT (OUTPATIENT)
Dept: DERMATOLOGY | Facility: CLINIC | Age: 19
End: 2019-01-16
Payer: COMMERCIAL

## 2019-01-16 DIAGNOSIS — L72.0 EIC (EPIDERMAL INCLUSION CYST): Primary | ICD-10-CM

## 2019-01-16 PROCEDURE — 99213 OFFICE O/P EST LOW 20 MIN: CPT | Performed by: DERMATOLOGY

## 2019-01-16 NOTE — LETTER
"    1/16/2019         RE: Guillemro Harris  [address] [address] [address]        Dear Colleague,    Thank you for referring your patient, Guillermo Harris, to the [facility]. Please see a copy of my visit note below.    [facility] Dermatology Note      Dermatology Problem List:  1. Small EIC, left cheek, s/p biopsy 1/8/2019 which showed fibrous papule with EIC underlying  -Consider surgical removal if becomes larger in size    Encounter Date: Jan 16, 2019    CC:  Chief Complaint   Patient presents with     Lesion     left cheek - discuss complete removal         History of Present Illness:  Mr. Guillermo Harris is a 18 year old male who presents as a follow up for biopsy done on 1/8/2019. He is here to discuss results and complete removal. He sys that the white head \"strings out\" of the lesions still. This is what bothers him about it. It seems like a pimple that never resolves. He has not noticed a lump underneath the skin. No other concerns addressed today.    Past Medical History:   Patient Active Problem List   Diagnosis     Seasonal allergies     Sprain and strain of hip and thigh     Fracture of finger of left hand- left, S-H 1, mid 5th phalange     Past Medical History:   Diagnosis Date     Febrile seizure (H)      Seasonal allergies     Spring     Past Surgical History:   Procedure Laterality Date     NO HISTORY OF SURGERY         Social History:  Patient reports that  has never smoked. he has never used smokeless tobacco. He reports that he drinks alcohol. He reports that he does not use drugs. Patient is currently a student.     Family History:  Family History   Problem Relation Age of Onset     Cardiovascular Paternal Grandfather      Lipids Paternal Uncle      Hypertension Maternal Grandfather      Cancer Maternal Aunt         skin cancer.     Other Cancer Maternal Grandmother      Skin Cancer No family hx of        Medications:  Current Outpatient " Medications   Medication Sig Dispense Refill     amoxicillin-clavulanate (AUGMENTIN) 875-125 MG per tablet Take 1 tablet by mouth 2 times daily 20 tablet 0     pseudoePHEDrine (SUDAFED) 120 MG 12 hr tablet Take 1 tablet (120 mg) by mouth every 12 hours 28 tablet 0       Allergies   Allergen Reactions     No Known Drug Allergies      Seasonal Allergies      pollen       Review of Systems:  -Constitutional: Patient is otherwise feeling well, in usual state of health.   -Skin: As above in HPI. No additional skin concerns.    Physical exam:  Vitals: There were no vitals taken for this visit.  GEN: This is a well developed, well-nourished male in no acute distress, in a pleasant mood.    SKIN: Sun-exposed skin, which includes the head/face, neck, both arms, digits, and/or nails was examined.   - pink 3 mm papule with central white head on the right cheek, with pressure able to exude some keratin debris, no palpable subcutaneous nodule associated with it.   - No other lesions of concern on areas examined.     Impression/Plan:  1. Small EIC. Discussed pros/cons of removal with the patient. Since the area is so small and excision would leave scar in cosmetically sensitive area, recommended against at this time. After discussion, patient decided to monitor the area for growth or changes. If the area gets much bigger, can refer to Dr. Dewitt for excision.     Follow up prn.      Staff Involved:  Scribe/Staff    Scribe Disclosure  I, Taisha Castro, am serving as a scribe to document services personally performed by Dr. Raven Steve MD, based on data collection and the provider's statements to me.     Provider Disclosure:   The documentation recorded by the scribe accurately reflects the services I personally performed and the decisions made by me.    Raven Steve MD    Department of Dermatology  [facility]: Phone: [phone],  Fax:[phone]  [facility]: Phone: [phone], Fax: [phone]                Again, thank you for allowing me to participate in the care of your patient.        Sincerely,        Raven Steve MD

## 2019-01-16 NOTE — PROGRESS NOTES
"[facility] Dermatology Note      Dermatology Problem List:  1. Small EIC, left cheek, s/p biopsy 1/8/2019 which showed fibrous papule with EIC underlying  -Consider surgical removal if becomes larger in size    Encounter Date: Jan 16, 2019    CC:  Chief Complaint   Patient presents with     Lesion     left cheek - discuss complete removal         History of Present Illness:  Mr. Guillermo Harris is a 18 year old male who presents as a follow up for biopsy done on 1/8/2019. He is here to discuss results and complete removal. He sys that the white head \"strings out\" of the lesions still. This is what bothers him about it. It seems like a pimple that never resolves. He has not noticed a lump underneath the skin. No other concerns addressed today.    Past Medical History:   Patient Active Problem List   Diagnosis     Seasonal allergies     Sprain and strain of hip and thigh     Fracture of finger of left hand- left, S-H 1, mid 5th phalange     Past Medical History:   Diagnosis Date     Febrile seizure (H)      Seasonal allergies     Spring     Past Surgical History:   Procedure Laterality Date     NO HISTORY OF SURGERY         Social History:  Patient reports that  has never smoked. he has never used smokeless tobacco. He reports that he drinks alcohol. He reports that he does not use drugs. Patient is currently a student.     Family History:  Family History   Problem Relation Age of Onset     Cardiovascular Paternal Grandfather      Lipids Paternal Uncle      Hypertension Maternal Grandfather      Cancer Maternal Aunt         skin cancer.     Other Cancer Maternal Grandmother      Skin Cancer No family hx of        Medications:  Current Outpatient Medications   Medication Sig Dispense Refill     amoxicillin-clavulanate (AUGMENTIN) 875-125 MG per tablet Take 1 tablet by mouth 2 times daily 20 tablet 0     pseudoePHEDrine (SUDAFED) 120 MG 12 hr tablet Take 1 tablet (120 mg) by mouth every 12 hours " 28 tablet 0       Allergies   Allergen Reactions     No Known Drug Allergies      Seasonal Allergies      pollen       Review of Systems:  -Constitutional: Patient is otherwise feeling well, in usual state of health.   -Skin: As above in HPI. No additional skin concerns.    Physical exam:  Vitals: There were no vitals taken for this visit.  GEN: This is a well developed, well-nourished male in no acute distress, in a pleasant mood.    SKIN: Sun-exposed skin, which includes the head/face, neck, both arms, digits, and/or nails was examined.   - pink 3 mm papule with central white head on the right cheek, with pressure able to exude some keratin debris, no palpable subcutaneous nodule associated with it.   - No other lesions of concern on areas examined.     Impression/Plan:  1. Small EIC. Discussed pros/cons of removal with the patient. Since the area is so small and excision would leave scar in cosmetically sensitive area, recommended against at this time. After discussion, patient decided to monitor the area for growth or changes. If the area gets much bigger, can refer to Dr. Dewitt for excision.     Follow up prn.      Staff Involved:  Scribe/Staff    Scribe Disclosure  I, Taisha Castro, am serving as a scribe to document services personally performed by Dr. Raven Steve MD, based on data collection and the provider's statements to me.     Provider Disclosure:   The documentation recorded by the scribe accurately reflects the services I personally performed and the decisions made by me.    Raven Steve MD    Department of Dermatology  [facility]: Phone: [phone], Fax:[phone]  [facility]: Phone: [phone], Fax: [phone]

## 2019-01-16 NOTE — NURSING NOTE
Guillermo Harris's goals for this visit include:   Chief Complaint   Patient presents with     Lesion     left cheek - discuss complete removal       He requests these members of his care team be copied on today's visit information: NO    PCP: Stephie Lowry    Referring Provider:  No referring provider defined for this encounter.    There were no vitals taken for this visit.    Do you need any medication refills at today's visit? NO    Shruti Solorzano CMA

## 2019-04-24 DIAGNOSIS — J30.2 SEASONAL ALLERGIES: ICD-10-CM

## 2019-04-24 NOTE — TELEPHONE ENCOUNTER
Reason for Call:  Medication or medication refill:    Do you use a [facility]?  Name of the pharmacy and phone number for the current request:  [facility] - [phone]    Name of the medication requested:  Ketotifen, AND Fluticasone    Other request: Patient is asking for refill from [facility].     Can we leave a detailed message on this number? YES    Phone number patient can be reached at: [phone]    Best Time: anyu    Call taken on 4/24/2019 at 2:49 PM by Adrian Sharp

## 2019-04-25 RX ORDER — FLUTICASONE PROPIONATE 50 MCG
1-2 SPRAY, SUSPENSION (ML) NASAL DAILY
Qty: 48 G | Refills: 3 | Status: SHIPPED | OUTPATIENT
Start: 2019-04-25

## 2019-04-25 NOTE — TELEPHONE ENCOUNTER
I can fill this is OTC. Hasn't been seen since 2017. Needs appt. For further refills. Jessica Ordoñez

## 2019-04-26 NOTE — PROGRESS NOTES
SUBJECTIVE:   Guillermo Harris is a 18 year old male who presents to clinic today for the following health issues:      HPI     ALLERGIES     Onset:     Description:   Nasal congestion: YES  Sneezing: YES  Red, itchy eyes: YES    Progression of Symptoms:  same    Accompanying Signs & Symptoms:  Cough: no  Wheezing: no  Rash: no  Sinus/facial pain: no   History:   Is it seasonal: in the fall and in the spring   History of Asthma: no  Has allergy testing been done: no       Therapies Tried and outcome: Zyrtec, Flonase    Additional history: as documented    Reviewed and updated as needed this visit by clinical staff  Tobacco  Allergies  Meds  Problems  Med Hx  Surg Hx  Fam Hx  Soc Hx          Reviewed and updated as needed this visit by Provider  Tobacco  Allergies  Meds  Problems  Med Hx  Surg Hx  Fam Hx             Patient Active Problem List   Diagnosis     Seasonal allergies     Sprain and strain of hip and thigh     Fracture of finger of left hand- left, S-H 1, mid 5th phalange     Past Surgical History:   Procedure Laterality Date     NO HISTORY OF SURGERY         Social History     Tobacco Use     Smoking status: Never Smoker     Smokeless tobacco: Never Used     Tobacco comment: no smokers in household   Substance Use Topics     Alcohol use: Yes     Family History   Problem Relation Age of Onset     Cardiovascular Paternal Grandfather      Lipids Paternal Uncle      Hypertension Maternal Grandfather      Cancer Maternal Aunt         skin cancer.     Other Cancer Maternal Grandmother      Skin Cancer No family hx of          Current Outpatient Medications   Medication Sig Dispense Refill     cetirizine (ZYRTEC) 10 MG tablet Take 10 mg by mouth daily       fluticasone (FLONASE) 50 MCG/ACT nasal spray Spray 1-2 sprays into both nostrils daily 48 g 3     ketotifen (ZADITOR) 0.025 % ophthalmic solution Place 1 drop into both eyes every 12 hours 1 Bottle 11     pseudoePHEDrine (SUDAFED) 120 MG 12  "hr tablet Take 1 tablet (120 mg) by mouth every 12 hours (Patient not taking: Reported on 4/29/2019) 28 tablet 0     Allergies   Allergen Reactions     No Known Drug Allergies      Seasonal Allergies      pollen     BP Readings from Last 3 Encounters:   04/29/19 104/68   04/02/18 100/78   03/30/18 104/76    Wt Readings from Last 3 Encounters:   04/29/19 79.8 kg (176 lb)   04/02/18 79.4 kg (175 lb) (77 %)*   03/30/18 79.4 kg (175 lb) (77 %)*     * Growth percentiles are based on [facility] (Boys, 2-20 Years) data.                  Labs reviewed in EPIC    ROS:  Constitutional, HEENT, cardiovascular, pulmonary, gi and gu systems are negative, except as otherwise noted.    OBJECTIVE:     /68 (BP Location: Right arm, Patient Position: Chair, Cuff Size: Adult Regular)   Pulse 78   Temp 97.6  F (36.4  C) (Temporal)   Resp 16   Ht 1.88 m (6' 2\")   Wt 79.8 kg (176 lb)   SpO2 100%   BMI 22.60 kg/m    Body mass index is 22.6 kg/m .   Physical Exam   Constitutional: He appears well-developed and well-nourished.   HENT:   Head: Normocephalic and atraumatic.   Eyes: EOM are normal.   Injected conjunctiva bilaterally   Neck: Neck supple.   Cardiovascular: Normal rate, regular rhythm and normal heart sounds.   Pulmonary/Chest: Effort normal and breath sounds normal.   Psychiatric: He has a normal mood and affect. His behavior is normal. Judgment and thought content normal.         Diagnostic Test Results:  none     ASSESSMENT/PLAN:     Problem List Items Addressed This Visit     Seasonal allergies     Acute allergic Conjunctivitis and rhinitis.  Previously been responded to ketotifen.  Refill prescription.  Continue Flonase as needed.         Relevant Medications    cetirizine (ZYRTEC) 10 MG tablet    ketotifen (ZADITOR) 0.025 % ophthalmic solution           Viry Dorantes MD  [facility]  "

## 2019-04-29 ENCOUNTER — OFFICE VISIT (OUTPATIENT)
Dept: FAMILY MEDICINE | Facility: OTHER | Age: 19
End: 2019-04-29
Payer: COMMERCIAL

## 2019-04-29 VITALS
HEART RATE: 78 BPM | DIASTOLIC BLOOD PRESSURE: 68 MMHG | SYSTOLIC BLOOD PRESSURE: 104 MMHG | TEMPERATURE: 97.6 F | HEIGHT: 74 IN | WEIGHT: 176 LBS | BODY MASS INDEX: 22.59 KG/M2 | OXYGEN SATURATION: 100 % | RESPIRATION RATE: 16 BRPM

## 2019-04-29 DIAGNOSIS — J30.2 SEASONAL ALLERGIES: ICD-10-CM

## 2019-04-29 PROCEDURE — 99213 OFFICE O/P EST LOW 20 MIN: CPT | Performed by: FAMILY MEDICINE

## 2019-04-29 RX ORDER — CETIRIZINE HYDROCHLORIDE 10 MG/1
10 TABLET ORAL DAILY
COMMUNITY

## 2019-04-29 ASSESSMENT — MIFFLIN-ST. JEOR: SCORE: 1878.08

## 2019-04-29 NOTE — ASSESSMENT & PLAN NOTE
Acute allergic Conjunctivitis and rhinitis.  Previously been responded to ketotifen.  Refill prescription.  Continue Flonase as needed.

## 2020-02-16 ENCOUNTER — HEALTH MAINTENANCE LETTER (OUTPATIENT)
Age: 20
End: 2020-02-16

## 2020-11-22 ENCOUNTER — HEALTH MAINTENANCE LETTER (OUTPATIENT)
Age: 20
End: 2020-11-22

## 2020-12-16 ENCOUNTER — OFFICE VISIT (OUTPATIENT)
Dept: FAMILY MEDICINE | Facility: OTHER | Age: 20
End: 2020-12-16
Payer: OTHER GOVERNMENT

## 2020-12-16 VITALS
WEIGHT: 178 LBS | RESPIRATION RATE: 14 BRPM | SYSTOLIC BLOOD PRESSURE: 104 MMHG | HEART RATE: 88 BPM | BODY MASS INDEX: 22.85 KG/M2 | TEMPERATURE: 98.2 F | DIASTOLIC BLOOD PRESSURE: 66 MMHG | OXYGEN SATURATION: 97 %

## 2020-12-16 DIAGNOSIS — E80.6 CONJUGATED HYPERBILIRUBINEMIA: ICD-10-CM

## 2020-12-16 DIAGNOSIS — R53.83 FATIGUE, UNSPECIFIED TYPE: ICD-10-CM

## 2020-12-16 DIAGNOSIS — R82.998 DARK URINE: Primary | ICD-10-CM

## 2020-12-16 DIAGNOSIS — R79.89 ELEVATED LFTS: ICD-10-CM

## 2020-12-16 LAB
ALBUMIN SERPL-MCNC: 3.3 G/DL (ref 3.4–5)
ALBUMIN UR-MCNC: 30 MG/DL
ALP SERPL-CCNC: 439 U/L (ref 40–150)
ALT SERPL W P-5'-P-CCNC: 319 U/L (ref 0–70)
ANION GAP SERPL CALCULATED.3IONS-SCNC: 2 MMOL/L (ref 3–14)
APPEARANCE UR: CLEAR
AST SERPL W P-5'-P-CCNC: 235 U/L (ref 0–45)
BACTERIA #/AREA URNS HPF: ABNORMAL /HPF
BASOPHILS # BLD AUTO: 0.1 10E9/L (ref 0–0.2)
BASOPHILS NFR BLD AUTO: 1 %
BILIRUB SERPL-MCNC: 3.1 MG/DL (ref 0.2–1.3)
BILIRUB UR QL STRIP: ABNORMAL
BUN SERPL-MCNC: 16 MG/DL (ref 7–30)
CALCIUM SERPL-MCNC: 8.8 MG/DL (ref 8.5–10.1)
CHLORIDE SERPL-SCNC: 99 MMOL/L (ref 94–109)
CO2 SERPL-SCNC: 32 MMOL/L (ref 20–32)
COLOR UR AUTO: ABNORMAL
CREAT SERPL-MCNC: 1.28 MG/DL (ref 0.66–1.25)
DIFFERENTIAL METHOD BLD: ABNORMAL
ERYTHROCYTE [DISTWIDTH] IN BLOOD BY AUTOMATED COUNT: 13.4 % (ref 10–15)
GFR SERPL CREATININE-BSD FRML MDRD: 79 ML/MIN/{1.73_M2}
GLUCOSE SERPL-MCNC: 89 MG/DL (ref 70–99)
GLUCOSE UR STRIP-MCNC: NEGATIVE MG/DL
HCT VFR BLD AUTO: 39 % (ref 40–53)
HGB BLD-MCNC: 13.1 G/DL (ref 13.3–17.7)
HGB UR QL STRIP: NEGATIVE
KETONES UR STRIP-MCNC: ABNORMAL MG/DL
LEUKOCYTE ESTERASE UR QL STRIP: NEGATIVE
LYMPHOCYTES # BLD AUTO: 6 10E9/L (ref 0.8–5.3)
LYMPHOCYTES NFR BLD AUTO: 56 %
MCH RBC QN AUTO: 29.9 PG (ref 26.5–33)
MCHC RBC AUTO-ENTMCNC: 33.6 G/DL (ref 31.5–36.5)
MCV RBC AUTO: 89 FL (ref 78–100)
MONOCYTES # BLD AUTO: 1.9 10E9/L (ref 0–1.3)
MONOCYTES NFR BLD AUTO: 18 %
MUCOUS THREADS #/AREA URNS LPF: PRESENT /LPF
NEUTROPHILS # BLD AUTO: 2.7 10E9/L (ref 1.6–8.3)
NEUTROPHILS NFR BLD AUTO: 25 %
NITRATE UR QL: NEGATIVE
PH UR STRIP: 6 PH (ref 5–7)
PLATELET # BLD AUTO: 166 10E9/L (ref 150–450)
POTASSIUM SERPL-SCNC: 4.5 MMOL/L (ref 3.4–5.3)
PROT SERPL-MCNC: 8.2 G/DL (ref 6.8–8.8)
RBC # BLD AUTO: 4.38 10E12/L (ref 4.4–5.9)
RBC #/AREA URNS AUTO: ABNORMAL /HPF
SODIUM SERPL-SCNC: 133 MMOL/L (ref 133–144)
SOURCE: ABNORMAL
SP GR UR STRIP: 1.02 (ref 1–1.03)
UROBILINOGEN UR STRIP-ACNC: 2 EU/DL (ref 0.2–1)
WBC # BLD AUTO: 10.7 10E9/L (ref 4–11)
WBC #/AREA URNS AUTO: ABNORMAL /HPF

## 2020-12-16 PROCEDURE — 80053 COMPREHEN METABOLIC PANEL: CPT | Performed by: PHYSICIAN ASSISTANT

## 2020-12-16 PROCEDURE — 99214 OFFICE O/P EST MOD 30 MIN: CPT | Performed by: PHYSICIAN ASSISTANT

## 2020-12-16 PROCEDURE — 36415 COLL VENOUS BLD VENIPUNCTURE: CPT | Performed by: PHYSICIAN ASSISTANT

## 2020-12-16 PROCEDURE — 85025 COMPLETE CBC W/AUTO DIFF WBC: CPT | Performed by: PHYSICIAN ASSISTANT

## 2020-12-16 PROCEDURE — 81001 URINALYSIS AUTO W/SCOPE: CPT | Performed by: PHYSICIAN ASSISTANT

## 2020-12-16 NOTE — PROGRESS NOTES
"Subjective     Guillermo Harris is a 20 year old male who presents to clinic today for the following health issues:    HPI         Concern - orange colored urine  Onset: 5 days  Description: upset stomach, lack of appetite, and fatigue  Intensity: mild, moderate  Progression of Symptoms:  same  Accompanying Signs & Symptoms: denies odors and dysuria   Previous history of similar problem: no  Precipitating factors:        Worsened by: nothing  Alleviating factors:        Improved by: nothing  Therapies tried and outcome: increased fluid intake and rest no relief     - he denies fevers, sore throat, ear pain, cough, shortness of breath, wheezing, loss of taste/smell, diarrhea, rashes.  Denies dysuria, urinary frequency, penile discharge, testicular pain.    - he has had some upper nasal drainage and says that it isn't causing him shortness of breath but some chest congestion.   - His symptoms when they first started were more \"like a cold\" but that those symptoms had resolved.   - he says that he has had sweats but no fevers.   - no concerns about STDs.     Review of Systems   Constitutional, HEENT, cardiovascular, pulmonary, gi and gu systems are negative, except as otherwise noted.      Objective    /66   Pulse 88   Temp 98.2  F (36.8  C) (Temporal)   Resp 14   Wt 80.7 kg (178 lb)   SpO2 97%   BMI 22.85 kg/m    Body mass index is 22.85 kg/m .  Physical Exam   GENERAL: healthy, alert and no distress  RESP: lungs clear to auscultation - no rales, rhonchi or wheezes  CV: regular rate and rhythm, normal S1 S2, no S3 or S4, no murmur, click or rub, no peripheral edema and peripheral pulses strong  ABDOMEN: bowel sounds normal and soft, non-distended, tenderness to palpation LUQ, LLQ, no organomegaly   (male): normal male genitalia without lesions or urethral discharge, no hernia  MS: no gross musculoskeletal defects noted, no edema  SKIN: no suspicious lesions or rashes    Results for orders placed or " performed in visit on 12/16/20 (from the past 24 hour(s))   *UA reflex to Microscopic   Result Value Ref Range    Color Urine Namita     Appearance Urine Clear     Glucose Urine Negative NEG^Negative mg/dL    Bilirubin Urine Moderate (A) NEG^Negative    Ketones Urine Trace (A) NEG^Negative mg/dL    Specific Gravity Urine 1.020 1.003 - 1.035    Blood Urine Negative NEG^Negative    pH Urine 6.0 5.0 - 7.0 pH    Protein Albumin Urine 30 (A) NEG^Negative mg/dL    Urobilinogen Urine 2.0 (H) 0.2 - 1.0 EU/dL    Nitrite Urine Negative NEG^Negative    Leukocyte Esterase Urine Negative NEG^Negative    Source Midstream Urine    Urine Microscopic   Result Value Ref Range    WBC Urine 0 - 5 OTO5^0 - 5 /HPF    RBC Urine O - 2 OTO2^O - 2 /HPF    Bacteria Urine Few (A) NEG^Negative /HPF    Mucous Urine Present (A) NEG^Negative /LPF   CBC with platelets differential   Result Value Ref Range    WBC 10.7 4.0 - 11.0 10e9/L    RBC Count 4.38 (L) 4.4 - 5.9 10e12/L    Hemoglobin 13.1 (L) 13.3 - 17.7 g/dL    Hematocrit 39.0 (L) 40.0 - 53.0 %    MCV 89 78 - 100 fl    MCH 29.9 26.5 - 33.0 pg    MCHC 33.6 31.5 - 36.5 g/dL    RDW 13.4 10.0 - 15.0 %    Platelet Count 166 150 - 450 10e9/L    % Neutrophils 25.0 %    % Lymphocytes 56.0 %    % Monocytes 18.0 %    % Basophils 1.0 %    Absolute Neutrophil 2.7 1.6 - 8.3 10e9/L    Absolute Lymphocytes 6.0 (H) 0.8 - 5.3 10e9/L    Absolute Monocytes 1.9 (H) 0.0 - 1.3 10e9/L    Absolute Basophils 0.1 0.0 - 0.2 10e9/L    Diff Method Manual Differential    Comprehensive metabolic panel   Result Value Ref Range    Sodium 133 133 - 144 mmol/L    Potassium 4.5 3.4 - 5.3 mmol/L    Chloride 99 94 - 109 mmol/L    Carbon Dioxide 32 20 - 32 mmol/L    Anion Gap 2 (L) 3 - 14 mmol/L    Glucose 89 70 - 99 mg/dL    Urea Nitrogen 16 7 - 30 mg/dL    Creatinine 1.28 (H) 0.66 - 1.25 mg/dL    GFR Estimate 79 >60 mL/min/[1.73_m2]    GFR Estimate If Black >90 >60 mL/min/[1.73_m2]    Calcium 8.8 8.5 - 10.1 mg/dL    Bilirubin  "Total 3.1 (H) 0.2 - 1.3 mg/dL    Albumin 3.3 (L) 3.4 - 5.0 g/dL    Protein Total 8.2 6.8 - 8.8 g/dL    Alkaline Phosphatase 439 (H) 40 - 150 U/L     (H) 0 - 70 U/L     (H) 0 - 45 U/L           Assessment & Plan     Guillermo was seen today for urinary problem.    Diagnoses and all orders for this visit:    Dark urine  -     *UA reflex to Microscopic  -     Urine Microscopic  -     CBC with platelets differential  -     Comprehensive metabolic panel    Fatigue, unspecified type    Elevated LFTs  -     US Abdomen Complete; Future    Conjugated hyperbilirubinemia  -     US Abdomen Complete; Future            Concerned with possible urine infection or bilirubin issue based on his symptoms of urine color change.  UA was obtained and it showed bilirubin (indicating conjugated bilirubin) to be present without signs of infection (negative nitrites and leukocyte esterase) therefore a CBC was obtained to make sure that he did not have elevated WBC or leukocytosis both of which were negative or WNL.  A CMP was also obtained but was not immediate available.  We discussed his \"cold symptoms\" may indicate that this could be viral hepatitis as a cause, also consider mononucleosis, CMV, cannot rule out COVID at this time.  Elected to send him home as he was otherwise afebrile in clinic and otherwise stable while waiting on his CMP.  Given his abnormalities on his CMP showing elevated ALT, AST, Alk Phos, Bilirubin recommend next step evaluation in the morning with ultrasound of abdomen (patient already instructed to avoid eating in the morning in the event we needed additional evaluation) to assess for hepatic/biliary causes for his abnormal labs and symptoms, may need to additionally obtain labs pending the results of the ultrasound (hepatitis evaluation, etc).  Elected to start with ultrasound prior to more in-depth imaging at this time, if abnormalities that are appropriate for general surgery will have patient see " them same day for evaluation and recommendations. Possibility of stone.  He is young and has otherwise uncomplicated past medical history making other conditions less likely.     Return in about 1 day (around 12/17/2020) for For procedure.     Options for treatment and follow-up care were reviewed with the patient and/or guardian. Patient and/or guardian engaged in the decision making process and verbalized understanding of the options discussed and agreed with the final plan.     Nayeli Wills PA-C  [facility]

## 2020-12-17 ENCOUNTER — TELEPHONE (OUTPATIENT)
Dept: FAMILY MEDICINE | Facility: OTHER | Age: 20
End: 2020-12-17

## 2020-12-17 ENCOUNTER — HOSPITAL ENCOUNTER (OUTPATIENT)
Dept: ULTRASOUND IMAGING | Facility: CLINIC | Age: 20
Discharge: HOME | End: 2020-12-17
Attending: PHYSICIAN ASSISTANT | Admitting: PHYSICIAN ASSISTANT
Payer: OTHER GOVERNMENT

## 2020-12-17 DIAGNOSIS — R79.89 ELEVATED LFTS: ICD-10-CM

## 2020-12-17 DIAGNOSIS — E80.6 CONJUGATED HYPERBILIRUBINEMIA: ICD-10-CM

## 2020-12-17 DIAGNOSIS — K75.9 HEPATITIS: Primary | ICD-10-CM

## 2020-12-17 PROCEDURE — 76700 US EXAM ABDOM COMPLETE: CPT

## 2020-12-17 NOTE — TELEPHONE ENCOUNTER
Simone for patient to call us back to inform him that we have an ultra sound today at 9:00 am at [place] no drinking or eating

## 2020-12-18 DIAGNOSIS — K75.9 HEPATITIS: ICD-10-CM

## 2020-12-18 LAB — HETEROPH AB SER QL: POSITIVE

## 2020-12-18 PROCEDURE — 36415 COLL VENOUS BLD VENIPUNCTURE: CPT | Performed by: PHYSICIAN ASSISTANT

## 2020-12-18 PROCEDURE — 87340 HEPATITIS B SURFACE AG IA: CPT | Performed by: PHYSICIAN ASSISTANT

## 2020-12-18 PROCEDURE — 86644 CMV ANTIBODY: CPT | Performed by: PHYSICIAN ASSISTANT

## 2020-12-18 PROCEDURE — 86665 EPSTEIN-BARR CAPSID VCA: CPT | Performed by: PHYSICIAN ASSISTANT

## 2020-12-18 PROCEDURE — 86704 HEP B CORE ANTIBODY TOTAL: CPT | Performed by: PHYSICIAN ASSISTANT

## 2020-12-18 PROCEDURE — 86665 EPSTEIN-BARR CAPSID VCA: CPT | Mod: 59 | Performed by: PHYSICIAN ASSISTANT

## 2020-12-18 PROCEDURE — 86803 HEPATITIS C AB TEST: CPT | Performed by: PHYSICIAN ASSISTANT

## 2020-12-18 PROCEDURE — 86645 CMV ANTIBODY IGM: CPT | Performed by: PHYSICIAN ASSISTANT

## 2020-12-18 PROCEDURE — 86308 HETEROPHILE ANTIBODY SCREEN: CPT | Performed by: PHYSICIAN ASSISTANT

## 2020-12-18 PROCEDURE — 86706 HEP B SURFACE ANTIBODY: CPT | Performed by: PHYSICIAN ASSISTANT

## 2020-12-21 ENCOUNTER — NURSE TRIAGE (OUTPATIENT)
Dept: NURSING | Facility: CLINIC | Age: 20
End: 2020-12-21

## 2020-12-21 ENCOUNTER — TELEPHONE (OUTPATIENT)
Dept: FAMILY MEDICINE | Facility: OTHER | Age: 20
End: 2020-12-21

## 2020-12-21 DIAGNOSIS — B27.90 INFECTIOUS MONONUCLEOSIS WITHOUT COMPLICATION, INFECTIOUS MONONUCLEOSIS DUE TO UNSPECIFIED ORGANISM: Primary | ICD-10-CM

## 2020-12-21 DIAGNOSIS — R79.89 ELEVATED LFTS: ICD-10-CM

## 2020-12-21 DIAGNOSIS — K75.9 HEPATITIS: ICD-10-CM

## 2020-12-21 LAB
CMV IGG SERPL QL IA: 7.7 AI (ref 0–0.8)
CMV IGM SERPL QL IA: 2.9 AI (ref 0–0.8)
EBV VCA IGG SER QL IA: 0.2 AI (ref 0–0.8)
EBV VCA IGM SER QL IA: >4 AI (ref 0–0.8)
HBV CORE AB SERPL QL IA: NONREACTIVE
HBV SURFACE AB SERPL IA-ACNC: 96.49 M[IU]/ML
HBV SURFACE AG SERPL QL IA: NONREACTIVE
HCV AB SERPL QL IA: REACTIVE

## 2020-12-21 NOTE — TELEPHONE ENCOUNTER
Patient's mother called today.    Patient was triaged to be seen today at the [facility] in office appointment for a f/u on [place] diagnosis.    Please contact mother.    Thank you.    Central Scheduling  Jeniffer DONATO

## 2020-12-21 NOTE — TELEPHONE ENCOUNTER
Spoke with Prasanth.     He is having some symptoms.  Things aren't getting any better.    Neck is very swollen, hurts to talk.  It hurts to swallow as well.   Yesterday he vomited up phlegm.    He has been spitting a lot of phlegm into a cup because it hurts to swallow.   This morning he had diarrhea.   They are trying to push fluids.  Thinking he is drinking about 5 bottles of water per day.   Urine is still really dark.     We discussed symptomatic treatment.  We discussed symptoms which warrant emergent evaluation including symptoms of meningitis but right now it sounds like his neck symptoms are due to posterior cervical lymphadenopathy, we discussed how mom can examine him to assess for meningeal signs.    Of note he is positive for CMV and EBV.  His Hep B shows immunity and his Hep C is positive, concern that this is actually a false positive so will repeat in the next couple of weeks depending on liver labs.      Nayeli Wills PA-C

## 2020-12-21 NOTE — TELEPHONE ENCOUNTER
Message from ES about patient:     [10:15 AM] Nayeli Wills      I guess what I can see is that he has enlarged glands which we discussed were normal for mono.  I can call them when I get a chance but not likely going to be able to see him.  If it s emergent then probably UC or ED but if he is still eating drinking and no fevers or severe pain then wouldn t need to be seen      Spoke to patient and mom via verbal consent from patient. Relayed information above. They understand the need for labs to come back and that if he gets worse of feel they need to be seen today to go to ED or UC with symptoms. Patient aware we do not have C2C on file and that we will call his number.

## 2020-12-21 NOTE — TELEPHONE ENCOUNTER
Symptoms,   Diagnosed  With  MONO early last week. Symptoms came on early December.  Hurts for him to talk because his glands are so swollen   Mom reports he has   Enlarged spleen, and kidneys are enlarged.  Mom reports;   Symptoms are not getting any better.    Eating and drinking.very small amounts.  Taking pedialyte, gatoraid. Water.daily.  Had diarrhea this morning.  No blood in urine.patient reports.  No fever at this time.Now.  No fever in the past few days.    Drinking 5 or 6 bottles of fluid a day., gatoraid , pedialyte   Urinating normal amount, but it is dark orange.  Patient reporting   Neck glands very swollen.    RN writer advised , In person appointment today.for follow up.    Anitha Torres RN on 12/21/2020 at 9:22 AM      Additional Information    Patient wants to be seen    Recent medical visit within 24 hours AND condition/symptoms WORSE    Negative: Recent medical visit within 24 hours and NEW symptom that could be serious    Negative: Caller has URGENT question (includes prescribed medication questions) and triager unable to answer question    Protocols used: RECENT MEDICAL VISIT FOR ILLNESS FOLLOW-UP CALL-A-[place]

## 2020-12-23 DIAGNOSIS — K75.9 HEPATITIS: ICD-10-CM

## 2020-12-23 DIAGNOSIS — R79.89 ELEVATED LFTS: ICD-10-CM

## 2020-12-23 DIAGNOSIS — B27.90 INFECTIOUS MONONUCLEOSIS WITHOUT COMPLICATION, INFECTIOUS MONONUCLEOSIS DUE TO UNSPECIFIED ORGANISM: ICD-10-CM

## 2020-12-23 LAB
ALBUMIN SERPL-MCNC: 2.9 G/DL (ref 3.4–5)
ALBUMIN UR-MCNC: 30 MG/DL
ALP SERPL-CCNC: 565 U/L (ref 40–150)
ALT SERPL W P-5'-P-CCNC: 205 U/L (ref 0–70)
ANION GAP SERPL CALCULATED.3IONS-SCNC: 7 MMOL/L (ref 3–14)
APPEARANCE UR: CLEAR
AST SERPL W P-5'-P-CCNC: 156 U/L (ref 0–45)
BILIRUB SERPL-MCNC: 1.8 MG/DL (ref 0.2–1.3)
BILIRUB UR QL STRIP: ABNORMAL
BUN SERPL-MCNC: 15 MG/DL (ref 7–30)
CALCIUM SERPL-MCNC: 8.7 MG/DL (ref 8.5–10.1)
CHLORIDE SERPL-SCNC: 99 MMOL/L (ref 94–109)
CO2 SERPL-SCNC: 27 MMOL/L (ref 20–32)
COLOR UR AUTO: ABNORMAL
CREAT SERPL-MCNC: 1.12 MG/DL (ref 0.66–1.25)
GFR SERPL CREATININE-BSD FRML MDRD: >90 ML/MIN/{1.73_M2}
GLUCOSE SERPL-MCNC: 119 MG/DL (ref 70–99)
GLUCOSE UR STRIP-MCNC: NEGATIVE MG/DL
HGB UR QL STRIP: NEGATIVE
KETONES UR STRIP-MCNC: NEGATIVE MG/DL
LEUKOCYTE ESTERASE UR QL STRIP: NEGATIVE
MUCOUS THREADS #/AREA URNS LPF: PRESENT /LPF
NITRATE UR QL: NEGATIVE
PH UR STRIP: 6 PH (ref 5–7)
POTASSIUM SERPL-SCNC: 4 MMOL/L (ref 3.4–5.3)
PROT SERPL-MCNC: 8.3 G/DL (ref 6.8–8.8)
RBC #/AREA URNS AUTO: ABNORMAL /HPF
SODIUM SERPL-SCNC: 133 MMOL/L (ref 133–144)
SOURCE: ABNORMAL
SP GR UR STRIP: 1.02 (ref 1–1.03)
UROBILINOGEN UR STRIP-ACNC: 1 EU/DL (ref 0.2–1)
WBC #/AREA URNS AUTO: ABNORMAL /HPF

## 2020-12-23 PROCEDURE — 36415 COLL VENOUS BLD VENIPUNCTURE: CPT | Performed by: PHYSICIAN ASSISTANT

## 2020-12-23 PROCEDURE — 81001 URINALYSIS AUTO W/SCOPE: CPT | Performed by: PHYSICIAN ASSISTANT

## 2020-12-23 PROCEDURE — 80053 COMPREHEN METABOLIC PANEL: CPT | Performed by: PHYSICIAN ASSISTANT

## 2020-12-24 ENCOUNTER — MYC MEDICAL ADVICE (OUTPATIENT)
Dept: FAMILY MEDICINE | Facility: OTHER | Age: 20
End: 2020-12-24

## 2020-12-28 NOTE — TELEPHONE ENCOUNTER
danielhart not read, labs should be repeated this week.     Left message for pt to return our call

## 2021-04-04 ENCOUNTER — HEALTH MAINTENANCE LETTER (OUTPATIENT)
Age: 21
End: 2021-04-04

## 2021-09-19 ENCOUNTER — HEALTH MAINTENANCE LETTER (OUTPATIENT)
Age: 21
End: 2021-09-19

## 2022-01-14 ENCOUNTER — OFFICE VISIT (OUTPATIENT)
Dept: FAMILY MEDICINE | Facility: CLINIC | Age: 22
End: 2022-01-14

## 2022-01-14 VITALS — HEART RATE: 84 BPM | TEMPERATURE: 97 F | BODY MASS INDEX: 22.44 KG/M2 | WEIGHT: 174.8 LBS | OXYGEN SATURATION: 98 %

## 2022-01-14 DIAGNOSIS — R10.12 LUQ ABDOMINAL PAIN: Primary | ICD-10-CM

## 2022-01-14 DIAGNOSIS — R76.8 POSITIVE HEPATITIS C ANTIBODY TEST: ICD-10-CM

## 2022-01-14 LAB
BASOPHILS # BLD AUTO: 0 10E3/UL (ref 0–0.2)
BASOPHILS NFR BLD AUTO: 1 %
EOSINOPHIL # BLD AUTO: 0.1 10E3/UL (ref 0–0.7)
EOSINOPHIL NFR BLD AUTO: 2 %
ERYTHROCYTE [DISTWIDTH] IN BLOOD BY AUTOMATED COUNT: 12.1 % (ref 10–15)
HCT VFR BLD AUTO: 43.2 % (ref 40–53)
HGB BLD-MCNC: 15.1 G/DL (ref 13.3–17.7)
LYMPHOCYTES # BLD AUTO: 2.7 10E3/UL (ref 0.8–5.3)
LYMPHOCYTES NFR BLD AUTO: 44 %
MCH RBC QN AUTO: 29.9 PG (ref 26.5–33)
MCHC RBC AUTO-ENTMCNC: 35 G/DL (ref 31.5–36.5)
MCV RBC AUTO: 86 FL (ref 78–100)
MONOCYTES # BLD AUTO: 0.5 10E3/UL (ref 0–1.3)
MONOCYTES NFR BLD AUTO: 8 %
NEUTROPHILS # BLD AUTO: 2.9 10E3/UL (ref 1.6–8.3)
NEUTROPHILS NFR BLD AUTO: 47 %
PLATELET # BLD AUTO: 300 10E3/UL (ref 150–450)
RBC # BLD AUTO: 5.05 10E6/UL (ref 4.4–5.9)
WBC # BLD AUTO: 6.1 10E3/UL (ref 4–11)

## 2022-01-14 PROCEDURE — 85025 COMPLETE CBC W/AUTO DIFF WBC: CPT | Performed by: INTERNAL MEDICINE

## 2022-01-14 PROCEDURE — 36415 COLL VENOUS BLD VENIPUNCTURE: CPT | Performed by: INTERNAL MEDICINE

## 2022-01-14 PROCEDURE — 86803 HEPATITIS C AB TEST: CPT | Performed by: INTERNAL MEDICINE

## 2022-01-14 PROCEDURE — 99214 OFFICE O/P EST MOD 30 MIN: CPT | Performed by: INTERNAL MEDICINE

## 2022-01-14 PROCEDURE — 80076 HEPATIC FUNCTION PANEL: CPT | Performed by: INTERNAL MEDICINE

## 2022-01-14 ASSESSMENT — PAIN SCALES - GENERAL: PAINLEVEL: MILD PAIN (2)

## 2022-01-14 NOTE — PROGRESS NOTES
Assessment & Plan     LUQ abdominal pain  Would be atypical to have a recurrence of splenomegaly after a year and without other infectious symptoms. But the pain doesn't really sounds msk either.  Will start with follow up ultrasound and labs.  OTC tylenol and ibuprofen before bed for pain.   - US Abdomen Limited; Future  - Hepatic panel (Albumin, ALT, AST, Bili, Alk Phos, TP); Future  - CBC with platelets and differential; Future  - CBC with platelets and differential  - Hepatic panel (Albumin, ALT, AST, Bili, Alk Phos, TP)    Positive hepatitis C antibody test  Hep C ab was positive on the workup for mono last year, it was advised that he repeat this and that hasn't been done so will check today since we are getting lab work   - Hepatitis C Screen Reflex to HCV RNA Quant and Genotype; Future  - Hepatitis C Screen Reflex to HCV RNA Quant and Genotype          Return in about 1 week (around 1/21/2022) for pending lab and ultrasound results .    Argenis Chaparro MD  [facility]   Guillermo is a 21 year old who presents for the following health issues     HPI     Concern - side pain  Guillermo had a pretty severe case of mono last year (12/2020).  He had splenomegaly at that time. Was supposed to have a follow up ultrasound to makes sure this resolved, but he felt better and never ended up getting that done.  About a week ago he developed left upper quadrant/side/back discomfort that feels very similar to how he felt with the enlarged spleen.  It is a little worse with certain position changes.  It is making it hard to sleep. No injuries or new activities. Hasn't tried any OTC analgesia.  He denies any LAD, fevers, chills, urinary symptoms, nausea, vomiting. He does endorse some diarrhea.  Otherwise aside from the discomfort generally feels well.     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       Review of Systems    Const, lymph, heent, cv, pulm, gi, gu, msk reviewed,  otherwise negative unless noted above.        Objective    Pulse 84   Temp 97  F (36.1  C) (Tympanic)   Wt 79.3 kg (174 lb 12.8 oz)   SpO2 98%   BMI 22.44 kg/m    Body mass index is 22.44 kg/m .  Physical Exam   Gen: well appearing, pleasant young man, no distress  HEENT: PERRL, sclera nonicteric, MMM  Neck: supple, no LAD  Pulm: breathing comfortably, CTAB, no wheezes or rales  CV: RRR, normal S1 and S2, no murmurs  Abd: BS present, soft, nontender, nondistended, no HSM appreciated, no CVA tenderness   Ext: 2+ distal pulses, no LE edema

## 2022-01-15 LAB
ALBUMIN SERPL-MCNC: 4.4 G/DL (ref 3.4–5)
ALP SERPL-CCNC: 59 U/L (ref 40–150)
ALT SERPL W P-5'-P-CCNC: 21 U/L (ref 0–70)
AST SERPL W P-5'-P-CCNC: 18 U/L (ref 0–45)
BILIRUB DIRECT SERPL-MCNC: 0.2 MG/DL (ref 0–0.2)
BILIRUB SERPL-MCNC: 2.9 MG/DL (ref 0.2–1.3)
PROT SERPL-MCNC: 7.7 G/DL (ref 6.8–8.8)

## 2022-01-17 LAB — HCV AB SERPL QL IA: NONREACTIVE

## 2022-01-28 ENCOUNTER — NURSE TRIAGE (OUTPATIENT)
Dept: FAMILY MEDICINE | Facility: CLINIC | Age: 22
End: 2022-01-28
Payer: COMMERCIAL

## 2022-01-28 NOTE — TELEPHONE ENCOUNTER
If the pain is severe or he indeed notices jaundice, he should be seen today.  If pain is similar and just mildly worse, okay to wait until Monday.      Argenis Chaparro MD

## 2022-01-28 NOTE — TELEPHONE ENCOUNTER
Patient Contact     Called pt and delayed Dr. Chaparro's message, see below. He denies severe pain but he has noticed slight yellowish tinge to eyes. Writer told him if he is noticing jaundice then he should be seen more urgently today. He stated his understanding but did not want to go in urgently to UC or ER at this time. He stated he will continue to monitor and if he develops severe pain or worsening jaundice he will go in to be seen urgently at UC or ER otherwise he will wait till Monday. He had no further questions or concerns at this time.     Mely KNIGHT RN  [facility]

## 2022-01-28 NOTE — TELEPHONE ENCOUNTER
"Nurse Triage SBAR    Is this a 2nd Level Triage? YES, LICENSED PRACTITIONER REVIEW IS REQUIRED    Situation : Pt has been experiencing LUQ abdominal pain since about 1/7. He states this is worsening, becoming constant, and interfering  with his normal activities.     Background : He did see Dr. Chaparro for this on the 14th and does have future US and labs scheduled for Monday.      Assessment : see below.     (See information below for more triage details.)    Recommendation : Routing to provider for recommendation on protocol states to be seen today. Pt was just seen by you on the 14th. His US and labs are scheduled for Monday. Is this OK to wait? Do you think he needs to be seen more urgently for this?    Protocol Recommended Disposition: Immediate office evaluation    OK to leave detailed vm.    Reason for Disposition    Constant abdominal pain lasting > 2 hours    Additional Information    Negative: Passed out (i.e., fainted, collapsed and was not responding)    Negative: Shock suspected (e.g., cold/pale/clammy skin, too weak to stand, low BP, rapid pulse)    Negative: Sounds like a life-threatening emergency to the triager    Negative: Chest pain    Negative: Pain is mainly in upper abdomen (if needed ask: 'is it mainly above the belly button?')    Negative: SEVERE abdominal pain (e.g., excruciating)    Negative: Vomiting red blood or black (coffee ground) material    Negative: Bloody, black, or tarry bowel movements (Exception: chronic-unchanged black-grey bowel movements and is taking iron pills or Pepto-bismol)    Negative: Unable to urinate (or only a few drops) and bladder feels very full    Negative: Pain in scrotum persists > 1 hour    Answer Assessment - Initial Assessment Questions  1. LOCATION: \"Where does it hurt?\"         LUQ     2. RADIATION: \"Does the pain shoot anywhere else?\" (e.g., chest, back)        Into his back.     3. ONSET: \"When did the pain begin?\" (Minutes, hours or days ago)       " "  Started around 1/7/22. Did see Dr. Chaparro on the 14th regarding this. Has US scheduled for Mon and repeat labs Mon but concerned this is worsening.     4. SUDDEN: \"Gradual or sudden onset?\"        Gradual. It has been gradually worsening.     5. PATTERN \"Does the pain come and go, or is it constant?\"     - If constant: \"Is it getting better, staying the same, or worsening?\"       (Note: Constant means the pain never goes away completely; most serious pain is constant and it progresses)      - If intermittent: \"How long does it last?\" \"Do you have pain now?\"      (Note: Intermittent means the pain goes away completely between bouts)        Constant, it is worsening as well.     6. SEVERITY: \"How bad is the pain?\"  (e.g., Scale 1-10; mild, moderate, or severe)     - MILD (1-3): doesn't interfere with normal activities, abdomen soft and not tender to touch      - MODERATE (4-7): interferes with normal activities or awakens from sleep, tender to touch      - SEVERE (8-10): excruciating pain, doubled over, unable to do any normal activities          3-4/10. Moderate, it is now interfering with his normal activities.     7. RECURRENT SYMPTOM: \"Have you ever had this type of abdominal pain before?\" If so, ask: \"When was the last time?\" and \"What happened that time?\"         He has experienced something similar a year ago. Had mono and his liver was enflamed. US a year ago, swelling would probably go away when the Mono would go away but he never had this rechecked.     8. CAUSE: \"What do you think is causing the abdominal pain?\"        Unsure at this time. Dr. Chaparro ordered US and labs to continue this work up they are scheduled for Monday.     9. RELIEVING/AGGRAVATING FACTORS: \"What makes it better or worse?\" (e.g., movement, antacids, bowel movement)        Nothing making it better. Usually after eating a big meal this is worse. He has been trying to eat smaller meals.     10. OTHER SYMPTOMS: \"Has there been any " "vomiting, diarrhea, constipation, or urine problems?\"          Diarrhea. This started around the same time the pain started, this has also been constant.    Protocols used: ABDOMINAL PAIN - MALE-A-[place]    GO TO ED/UCC NOW (OR TO OFFICE WITH PCP APPROVAL): * After using nurse judgment regarding the most appropriate site, tell the caller: Leave NOW.   * TRIAGER CAUTION: In selecting the most appropriate care site, you must consider both the severity of the patient's symptoms AND what resources are available at that care site.  * ED: Patients who may need surgery, sound seriously ill or may be unstable need to be sent to an ED. Likewise, so do most patients with complex medical problems and serious symptoms.  * UCC: Some UCCs can manage patients who are stable and have less serious symptoms (e.g., minor illnesses and injuries). The triager must know the UCC capabilities before sending a patient there. If unsure, call ahead.  * OFFICE: If patient sounds stable and not seriously ill, consult PCP (or follow your office policy) to see if patient can be seen NOW in office.      CALL BACK IF:  * Abdominal pain is constant and present for more than 2 hours  * Abdominal pains come and go and are present for more than 24 hours  * You become worse        Patient/Caregiver understands and will follow care advice? Other, see documentation     Tia G. RN  [facility]   "

## 2022-01-31 ENCOUNTER — HOSPITAL ENCOUNTER (OUTPATIENT)
Dept: ULTRASOUND IMAGING | Facility: CLINIC | Age: 22
Discharge: HOME | End: 2022-01-31
Attending: INTERNAL MEDICINE | Admitting: INTERNAL MEDICINE
Payer: COMMERCIAL

## 2022-01-31 DIAGNOSIS — R10.12 LUQ ABDOMINAL PAIN: ICD-10-CM

## 2022-01-31 PROCEDURE — 76705 ECHO EXAM OF ABDOMEN: CPT

## 2022-04-30 ENCOUNTER — HEALTH MAINTENANCE LETTER (OUTPATIENT)
Age: 22
End: 2022-04-30

## 2022-11-20 ENCOUNTER — HEALTH MAINTENANCE LETTER (OUTPATIENT)
Age: 22
End: 2022-11-20

## 2023-01-13 ENCOUNTER — ANCILLARY PROCEDURE (OUTPATIENT)
Dept: GENERAL RADIOLOGY | Facility: CLINIC | Age: 23
End: 2023-01-13
Attending: FAMILY MEDICINE
Payer: COMMERCIAL

## 2023-01-13 ENCOUNTER — OFFICE VISIT (OUTPATIENT)
Dept: FAMILY MEDICINE | Facility: CLINIC | Age: 23
End: 2023-01-13
Payer: COMMERCIAL

## 2023-01-13 VITALS
SYSTOLIC BLOOD PRESSURE: 136 MMHG | TEMPERATURE: 98.3 F | DIASTOLIC BLOOD PRESSURE: 82 MMHG | HEART RATE: 85 BPM | WEIGHT: 181.4 LBS | OXYGEN SATURATION: 98 % | BODY MASS INDEX: 23.28 KG/M2 | RESPIRATION RATE: 15 BRPM | HEIGHT: 74 IN

## 2023-01-13 DIAGNOSIS — F41.9 ANXIETY: ICD-10-CM

## 2023-01-13 DIAGNOSIS — R29.898 POPPING SOUND OF KNEE JOINT: ICD-10-CM

## 2023-01-13 DIAGNOSIS — Z72.0 NICOTINE USE: ICD-10-CM

## 2023-01-13 DIAGNOSIS — M25.362 PATELLAR INSTABILITY OF LEFT KNEE: Primary | ICD-10-CM

## 2023-01-13 PROCEDURE — 0134A COVID-19 VACCINE BIVALENT BOOSTER 18+ (MODERNA): CPT | Performed by: FAMILY MEDICINE

## 2023-01-13 PROCEDURE — 91313 COVID-19 VACCINE BIVALENT BOOSTER 18+ (MODERNA): CPT | Performed by: FAMILY MEDICINE

## 2023-01-13 PROCEDURE — 99214 OFFICE O/P EST MOD 30 MIN: CPT | Performed by: FAMILY MEDICINE

## 2023-01-13 PROCEDURE — 73562 X-RAY EXAM OF KNEE 3: CPT | Mod: TC | Performed by: RADIOLOGY

## 2023-01-13 RX ORDER — BUPROPION HYDROCHLORIDE 150 MG/1
150 TABLET ORAL EVERY MORNING
Qty: 30 TABLET | Refills: 0 | Status: SHIPPED | OUTPATIENT
Start: 2023-01-13 | End: 2023-03-14

## 2023-01-13 ASSESSMENT — ANXIETY QUESTIONNAIRES
8. IF YOU CHECKED OFF ANY PROBLEMS, HOW DIFFICULT HAVE THESE MADE IT FOR YOU TO DO YOUR WORK, TAKE CARE OF THINGS AT HOME, OR GET ALONG WITH OTHER PEOPLE?: SOMEWHAT DIFFICULT
2. NOT BEING ABLE TO STOP OR CONTROL WORRYING: MORE THAN HALF THE DAYS
GAD7 TOTAL SCORE: 9
1. FEELING NERVOUS, ANXIOUS, OR ON EDGE: MORE THAN HALF THE DAYS
3. WORRYING TOO MUCH ABOUT DIFFERENT THINGS: MORE THAN HALF THE DAYS
7. FEELING AFRAID AS IF SOMETHING AWFUL MIGHT HAPPEN: NOT AT ALL
4. TROUBLE RELAXING: SEVERAL DAYS
GAD7 TOTAL SCORE: 9
6. BECOMING EASILY ANNOYED OR IRRITABLE: MORE THAN HALF THE DAYS
7. FEELING AFRAID AS IF SOMETHING AWFUL MIGHT HAPPEN: NOT AT ALL
IF YOU CHECKED OFF ANY PROBLEMS ON THIS QUESTIONNAIRE, HOW DIFFICULT HAVE THESE PROBLEMS MADE IT FOR YOU TO DO YOUR WORK, TAKE CARE OF THINGS AT HOME, OR GET ALONG WITH OTHER PEOPLE: SOMEWHAT DIFFICULT
GAD7 TOTAL SCORE: 9
5. BEING SO RESTLESS THAT IT IS HARD TO SIT STILL: NOT AT ALL

## 2023-01-13 ASSESSMENT — PATIENT HEALTH QUESTIONNAIRE - PHQ9
SUM OF ALL RESPONSES TO PHQ QUESTIONS 1-9: 14
10. IF YOU CHECKED OFF ANY PROBLEMS, HOW DIFFICULT HAVE THESE PROBLEMS MADE IT FOR YOU TO DO YOUR WORK, TAKE CARE OF THINGS AT HOME, OR GET ALONG WITH OTHER PEOPLE: VERY DIFFICULT
SUM OF ALL RESPONSES TO PHQ QUESTIONS 1-9: 14

## 2023-01-13 ASSESSMENT — PAIN SCALES - GENERAL: PAINLEVEL: NO PAIN (0)

## 2023-01-13 NOTE — PATIENT INSTRUCTIONS
At [facility], we strive to deliver an exceptional experience to you, every time we see you. If you receive a survey, please complete it as we do value your feedback.  If you have MyChart, you can expect to receive results automatically within 24 hours of their completion.  Your provider will send a note interpreting your results as well.   If you do not have MyChart, you should receive your results in about a week by mail.    Your care team:                            Family Medicine Internal Medicine   MD Kannan Mao MD Shantel Branch-Fleming, MD Srinivasa Vaka, MD Katya Belousova, PAMISSAEL Alvarado CNP, MD ([facility]   Nestor Araiza, MD Janie Blank MD Amelia Massimini APRN ENMA Montanez APRN MD Messi Keller MD          Clinic hours: Monday - Thursday 7 am-6 pm; Fridays 7 am-5 pm.   Urgent care: Monday - Friday 10 am- 8 pm; Saturday and Sunday 9 am-5 pm.    Clinic: [phone]       [facility]: Monday - Thursday 8 am - 7 pm; Friday 8 am - 6 pm  [facility]: [phone]

## 2023-01-13 NOTE — PROGRESS NOTES
Assessment & Plan     Patellar instability of left knee  -Prasanth felt like patella was subluxated and was able to bring it back.  -Currently no pain, able to walk without difficulty.    PLAN:  -Xray knee, Ortho referral sent.  - XR Knee Left 3 Views; Future  - Orthopedic  Referral; Future    Anxiety  -Generalized anxiety disorder, not on any medications in the past.  -Prescribed Wellbutrin along with therapy.  History of febrile seizure as a kid.  After age of 4 no history of seizure.  -I will follow-up in a month to reassess.    - buPROPion (WELLBUTRIN XL) 150 MG 24 hr tablet; Take 1 tablet (150 mg) by mouth every morning  - Adult Mental Health  Referral; Future    Nicotine use  -Currently using nicotine in the morning and THC at night.  -Used to take a lot of alcohol before but he was able to cut down.  -Recommended slowly tapering and stopping THC use.    - buPROPion (WELLBUTRIN XL) 150 MG 24 hr tablet; Take 1 tablet (150 mg) by mouth every morning           Depression Screening Follow Up    PHQ 1/13/2023   PHQ-9 Total Score 14   Q9: Thoughts of better off dead/self-harm past 2 weeks Several days   F/U: Thoughts of suicide or self-harm Yes   F/U: Self harm-plan No   F/U: Self-harm action No   F/U: Safety concerns No                 Follow Up      Follow Up Actions Taken  Crisis resource information provided in the After Visit Summary  Mental Health Referral placed    Discussed the following ways the patient can remain in a safe environment:  remove alcohol      Return in about 1 month (around 2/13/2023) for Anxiety.    Messi Loera MD  [facility]    Lisa Rader is a 22 year old, presenting for the following health issues:  Knee Pain and Mental Health Problem      History of Present Illness       Mental Health Follow-up:  Patient presents to follow-up on Depression & Anxiety.Patient's depression since last visit has been:  Worse  The patient  is having other symptoms associated with depression.  Patient's anxiety since last visit has been:  Better  The patient is having other symptoms associated with anxiety.  Any significant life events: financial concerns, health concerns and other  Patient is not feeling anxious or having panic attacks.  Patient has no concerns about alcohol or drug use.    Reason for visit:  Knee issues and mental health    He eats 0-1 servings of fruits and vegetables daily.He consumes 2 sweetened beverage(s) daily.He exercises with enough effort to increase his heart rate 30 to 60 minutes per day.  He exercises with enough effort to increase his heart rate 5 days per week.     Today's PHQ-9         PHQ-9 Total Score: 14    PHQ-9 Q9 Thoughts of better off dead/self-harm past 2 weeks :   Several days  Thoughts of suicide or self harm: (P) Yes  Self-harm Plan:   (P) No  Self-harm Action:     (P) No  Safety concerns for self or others: (P) No    How difficult have these problems made it for you to do your work, take care of things at home, or get along with other people: Very difficult  Today's LYSSA-7 Score: 9     Left knee popping out   Used to drink heavily before cut down  Nicotine at day and THC    Pain History:  When did you first notice your pain? - Acute Pain   Have you seen anyone else for your pain? No  Where in your body do you have pain? Musculoskeletal problem/pain  Onset/Duration: Four months  Description  Location: knee - left  Joint Swelling: No  Redness: No  Pain: YES  Warmth: No  Intensity:  mild, but sometimes severe.  Progression of Symptoms:  same and only painful when doing certain movements   Accompanying signs and symptoms:   Fevers: No  Numbness/tingling/weakness: No  History  Trauma to the area: No  Recent illness:  No  Previous similar problem: No  Previous evaluation:  No  Precipitating or alleviating factors:  Aggravating factors include: certain movements   Therapies tried and outcome:  "nothing          Review of Systems   Constitutional, HEENT, cardiovascular, pulmonary, gi and gu systems are negative, except as otherwise noted.      Objective    /82 (BP Location: Left arm, Patient Position: Sitting, Cuff Size: Adult Regular)   Pulse 85   Temp 98.3  F (36.8  C) (Oral)   Resp 15   Ht 1.87 m (6' 1.62\")   Wt 82.3 kg (181 lb 6.4 oz)   SpO2 98%   BMI 23.53 kg/m    Body mass index is 23.53 kg/m .  Physical Exam   GENERAL: healthy, alert and no distress  NECK: no adenopathy, no asymmetry, masses, or scars and thyroid normal to palpation  RESP: lungs clear to auscultation - no rales, rhonchi or wheezes  CV: regular rate and rhythm, normal S1 S2, no S3 or S4, no murmur, click or rub, no peripheral edema and peripheral pulses strong  ABDOMEN: soft, nontender, no hepatosplenomegaly, no masses and bowel sounds normal  MS: no gross musculoskeletal defects noted, no edema              "

## 2023-03-14 ENCOUNTER — VIRTUAL VISIT (OUTPATIENT)
Dept: FAMILY MEDICINE | Facility: CLINIC | Age: 23
End: 2023-03-14
Payer: COMMERCIAL

## 2023-03-14 DIAGNOSIS — F41.9 ANXIETY: ICD-10-CM

## 2023-03-14 DIAGNOSIS — Z72.0 NICOTINE USE: ICD-10-CM

## 2023-03-14 PROCEDURE — 99213 OFFICE O/P EST LOW 20 MIN: CPT | Mod: VID | Performed by: FAMILY MEDICINE

## 2023-03-14 RX ORDER — BUPROPION HYDROCHLORIDE 150 MG/1
150 TABLET ORAL EVERY MORNING
Qty: 30 TABLET | Refills: 0 | Status: SHIPPED | OUTPATIENT
Start: 2023-03-14 | End: 2023-03-14

## 2023-03-14 RX ORDER — BUPROPION HYDROCHLORIDE 150 MG/1
150 TABLET ORAL EVERY MORNING
Qty: 30 TABLET | Refills: 1 | Status: SHIPPED | OUTPATIENT
Start: 2023-03-14

## 2023-03-14 ASSESSMENT — ANXIETY QUESTIONNAIRES
8. IF YOU CHECKED OFF ANY PROBLEMS, HOW DIFFICULT HAVE THESE MADE IT FOR YOU TO DO YOUR WORK, TAKE CARE OF THINGS AT HOME, OR GET ALONG WITH OTHER PEOPLE?: NOT DIFFICULT AT ALL
6. BECOMING EASILY ANNOYED OR IRRITABLE: SEVERAL DAYS
7. FEELING AFRAID AS IF SOMETHING AWFUL MIGHT HAPPEN: NOT AT ALL
1. FEELING NERVOUS, ANXIOUS, OR ON EDGE: SEVERAL DAYS
7. FEELING AFRAID AS IF SOMETHING AWFUL MIGHT HAPPEN: NOT AT ALL
GAD7 TOTAL SCORE: 5
GAD7 TOTAL SCORE: 5
2. NOT BEING ABLE TO STOP OR CONTROL WORRYING: SEVERAL DAYS
5. BEING SO RESTLESS THAT IT IS HARD TO SIT STILL: SEVERAL DAYS
IF YOU CHECKED OFF ANY PROBLEMS ON THIS QUESTIONNAIRE, HOW DIFFICULT HAVE THESE PROBLEMS MADE IT FOR YOU TO DO YOUR WORK, TAKE CARE OF THINGS AT HOME, OR GET ALONG WITH OTHER PEOPLE: NOT DIFFICULT AT ALL
4. TROUBLE RELAXING: NOT AT ALL
3. WORRYING TOO MUCH ABOUT DIFFERENT THINGS: SEVERAL DAYS
GAD7 TOTAL SCORE: 5

## 2023-03-14 ASSESSMENT — PATIENT HEALTH QUESTIONNAIRE - PHQ9
10. IF YOU CHECKED OFF ANY PROBLEMS, HOW DIFFICULT HAVE THESE PROBLEMS MADE IT FOR YOU TO DO YOUR WORK, TAKE CARE OF THINGS AT HOME, OR GET ALONG WITH OTHER PEOPLE: NOT DIFFICULT AT ALL
SUM OF ALL RESPONSES TO PHQ QUESTIONS 1-9: 4
SUM OF ALL RESPONSES TO PHQ QUESTIONS 1-9: 4

## 2023-04-25 ENCOUNTER — OFFICE VISIT (OUTPATIENT)
Dept: FAMILY MEDICINE | Facility: CLINIC | Age: 23
End: 2023-04-25
Payer: COMMERCIAL

## 2023-04-25 VITALS
RESPIRATION RATE: 16 BRPM | BODY MASS INDEX: 22.36 KG/M2 | WEIGHT: 179.8 LBS | HEART RATE: 97 BPM | DIASTOLIC BLOOD PRESSURE: 84 MMHG | SYSTOLIC BLOOD PRESSURE: 127 MMHG | OXYGEN SATURATION: 100 % | HEIGHT: 75 IN | TEMPERATURE: 97.8 F

## 2023-04-25 DIAGNOSIS — K61.1 PERIRECTAL ABSCESS: Primary | ICD-10-CM

## 2023-04-25 LAB
BASOPHILS # BLD AUTO: 0 10E3/UL (ref 0–0.2)
BASOPHILS NFR BLD AUTO: 0 %
EOSINOPHIL # BLD AUTO: 0.2 10E3/UL (ref 0–0.7)
EOSINOPHIL NFR BLD AUTO: 2 %
ERYTHROCYTE [DISTWIDTH] IN BLOOD BY AUTOMATED COUNT: 12.1 % (ref 10–15)
HCT VFR BLD AUTO: 44.4 % (ref 40–53)
HGB BLD-MCNC: 15.7 G/DL (ref 13.3–17.7)
LYMPHOCYTES # BLD AUTO: 3.3 10E3/UL (ref 0.8–5.3)
LYMPHOCYTES NFR BLD AUTO: 39 %
MCH RBC QN AUTO: 30 PG (ref 26.5–33)
MCHC RBC AUTO-ENTMCNC: 35.4 G/DL (ref 31.5–36.5)
MCV RBC AUTO: 85 FL (ref 78–100)
MONOCYTES # BLD AUTO: 0.6 10E3/UL (ref 0–1.3)
MONOCYTES NFR BLD AUTO: 7 %
NEUTROPHILS # BLD AUTO: 4.4 10E3/UL (ref 1.6–8.3)
NEUTROPHILS NFR BLD AUTO: 52 %
PLATELET # BLD AUTO: 322 10E3/UL (ref 150–450)
RBC # BLD AUTO: 5.23 10E6/UL (ref 4.4–5.9)
WBC # BLD AUTO: 8.4 10E3/UL (ref 4–11)

## 2023-04-25 PROCEDURE — 36415 COLL VENOUS BLD VENIPUNCTURE: CPT | Performed by: PHYSICIAN ASSISTANT

## 2023-04-25 PROCEDURE — 85025 COMPLETE CBC W/AUTO DIFF WBC: CPT | Performed by: PHYSICIAN ASSISTANT

## 2023-04-25 PROCEDURE — 80048 BASIC METABOLIC PNL TOTAL CA: CPT | Performed by: PHYSICIAN ASSISTANT

## 2023-04-25 PROCEDURE — 99213 OFFICE O/P EST LOW 20 MIN: CPT | Performed by: PHYSICIAN ASSISTANT

## 2023-04-25 ASSESSMENT — PAIN SCALES - GENERAL: PAINLEVEL: NO PAIN (0)

## 2023-04-25 NOTE — PROGRESS NOTES
Assessment & Plan     (K61.1) Perirectal abscess  (primary encounter diagnosis)  Comment: Patient with evidence of perirectal abscess.  Discussed with patient antibiotics.  No history of MRSA or similar skin lesions.  This is actively draining on examination purulent fluid.  Discussed colorectal surgery as well as potential imaging.  Watching for signs of worsening including spreading redness, high fevers, malaise.  Patient will use sits baths and medications and follow-up as needed return with any worsening or new concerns  Plan: amoxicillin-clavulanate (AUGMENTIN) 875-125 MG         tablet, Basic metabolic panel  (Ca, Cl, CO2,         Creat, Gluc, K, Na, BUN), CBC with platelets         and differential, CT Abdomen Pelvis w Contrast,        Adult Colorectal Surgery  Referral             Ananda Schwab PA-C  [facility]   Prasanth is a 22 year old, presenting for the following health issues:  Rectal Problem (Pimples in his anus. ) and Medication Request (Refill on Flonase and Zyrtec. )         View : No data to display.              History of Present Illness       Reason for visit:  Puss or something near anus popped    He eats 0-1 servings of fruits and vegetables daily.He consumes 1 sweetened beverage(s) daily.He exercises with enough effort to increase his heart rate 30 to 60 minutes per day.  He exercises with enough effort to increase his heart rate 6 days per week. He is missing 1 dose(s) of medications per week.  He is not taking prescribed medications regularly due to remembering to take.     Over the last 24 hours the patient noted lesions about his rectum with pain.  1 of these appeared more like a pimple per the patient.  It did pop and cause some relief.  Another lesion appeared that ruptured over the course of the day.  Pain has improved.  No history of prior abscess or skin issues.  No history of MRSA.  Patient denies any recent rectal trauma or injury.   "No constipation.  No fevers or fatigue.  No bleeding from the site.       Review of Systems   Constitutional, HEENT, cardiovascular, pulmonary, gi and gu systems are negative, except as otherwise noted.      Objective    /84   Pulse 97   Temp 97.8  F (36.6  C) (Tympanic)   Resp 16   Ht 1.905 m (6' 3\")   Wt 81.6 kg (179 lb 12.8 oz)   SpO2 100%   BMI 22.47 kg/m    Body mass index is 22.47 kg/m .  Physical Exam   GENERAL: healthy, alert and no distress  RESP: Normal respiratory effort without evidence of respiratory distress.  RECTAL (male): 12 o'clock position with appearance of ruptured perirectal abscess.  Digital rectal examination performed with chaperone at bedside.  No bright red blood per rectum.  No obvious abscess within the rectal vault  MS: no gross musculoskeletal defects noted, no edema  PSYCH: mentation appears normal, affect normal/bright        "

## 2023-04-26 LAB
ANION GAP SERPL CALCULATED.3IONS-SCNC: 1 MMOL/L (ref 3–14)
BUN SERPL-MCNC: 18 MG/DL (ref 7–30)
CALCIUM SERPL-MCNC: 9.3 MG/DL (ref 8.5–10.1)
CHLORIDE BLD-SCNC: 104 MMOL/L (ref 94–109)
CO2 SERPL-SCNC: 33 MMOL/L (ref 20–32)
CREAT SERPL-MCNC: 1.33 MG/DL (ref 0.66–1.25)
GFR SERPL CREATININE-BSD FRML MDRD: 77 ML/MIN/1.73M2
GLUCOSE BLD-MCNC: 67 MG/DL (ref 70–99)
POTASSIUM BLD-SCNC: 4.4 MMOL/L (ref 3.4–5.3)
SODIUM SERPL-SCNC: 138 MMOL/L (ref 133–144)

## 2023-04-26 NOTE — TELEPHONE ENCOUNTER
Diagnosis, Referred by & from: Perianal Abscess F/U   Appt date: 5/1/2023   NOTES STATUS DETAILS   OFFICE NOTE from referring provider Internal [place] - [place]:  4/25/23 - [facility] OV with VANDANA Delgado   OFFICE NOTE from other specialist Internal [place] - Freya Macomb:  1/14/22 - [facility] OV with Dr. Chaparro   DISCHARGE SUMMARY from hospital N/A    DISCHARGE REPORT from the ER N/A    OPERATIVE REPORT N/A    MEDICATION LIST Internal    LABS N/A    DIAGNOSTIC PROCEDURES N/A    IMAGING (DISC & REPORT)      ULTRASOUND  (ENDOANAL/ENDORECTAL) Internal [facility]:  1/31/22 - US Abdomen  12/17/20 - US Abdomen

## 2023-05-01 ENCOUNTER — PRE VISIT (OUTPATIENT)
Dept: SURGERY | Facility: CLINIC | Age: 23
End: 2023-05-01

## 2023-06-02 ENCOUNTER — HEALTH MAINTENANCE LETTER (OUTPATIENT)
Age: 23
End: 2023-06-02

## 2024-06-22 ENCOUNTER — HEALTH MAINTENANCE LETTER (OUTPATIENT)
Age: 24
End: 2024-06-22

## 2025-06-02 NOTE — PROGRESS NOTES
6/2/2025      Antony Carlos  [address] [facility] [address]      Dear Colleague,    Thank you for referring your patient, Antony Carlos, to the [facility] [place]. Please see a copy of my visit note below.      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  A pneumatocele is present, and thin-walled.  He is not having any respiratory symptoms right now.  His pulmonary function testing is normal for the most part.  There is maybe mild restriction, but the rest of his lung function is normal    I think he is doing well from a breathing perspective.  I do not recommend any new testing.    He can follow-up in 1 year with pulmonary function testing.    Medical Decision Making            Harry Kumar MD                Today's visit note:     Chief Complaint:     HISTORY OF PRESENT ILLNESS:    Antony Carlos is a 22 year old year old male who is being seen to establish care in adult pulmonary clinic.  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  He had a previous umbilical cord blood transplant which unfortunately failed.  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  He has also had a pseudomonal pneumonia.  He has no other known lung disease.    He lives in [place], [place] of [place].  He comes up to [place] once a year for follow-up on his  Prasanth is a 22 year old who is being evaluated via a billable video visit.      How would you like to obtain your AVS? GoBeMe  If the video visit is dropped, the invitation should be resent by: Text to cell phone: [phone]  Will anyone else be joining your video visit? No          Assessment & Plan     Anxiety  -Prasanth presented as a follow-up after being on Wellbutrin 150 mg dose for a month  -He feels good with Wellbutrin and wanted to continue the same.  -Denied thoughts of hurting himself.  -Not started therapy yet.  He preferred to try medication first and hold on therapy.  -Notified Prasanth to update me via GoBeMe how he is doing in 1 or 2 months on 150 mg Wellbutrin for further refills.  -If he feels stable with no thoughts of severe depression/suicidal ideation in 2 months, will extend the refills.    - buPROPion (WELLBUTRIN XL) 150 MG 24 hr tablet; Take 1 tablet (150 mg) by mouth every morning    Nicotine use  -Currently vaping nicotine in the morning and THC at night.  -He was able to cut down a lot of alcohol.  -Interested in trying nicotine gums.  Wellbutrin to help with smoking cessation as well    - nicotine (NICORETTE) 2 MG gum; Place 1 each (2 mg) inside cheek every hour as needed for smoking cessation  - buPROPion (WELLBUTRIN XL) 150 MG 24 hr tablet; Take 1 tablet (150 mg) by mouth every morning             No follow-ups on file.    Messi Loera MD  [facility]    Subjective   Prasanth is a 22 year old, presenting for the following health issues:  Anxiety      History of Present Illness       Mental Health Follow-up:  Patient presents to follow-up on Depression & Anxiety.Patient's depression since last visit has been:  Better  The patient is not having other symptoms associated with depression.  Patient's anxiety since last visit has been:  Better  The patient is not having other symptoms associated with anxiety.  Any significant life events: No  Patient  is not feeling anxious or having panic attacks.  Patient has no concerns about alcohol or drug use.    He eats 2-3 servings of fruits and vegetables daily.He consumes 1 sweetened beverage(s) daily.He exercises with enough effort to increase his heart rate 60 or more minutes per day.  He exercises with enough effort to increase his heart rate 5 days per week.   He is taking medications regularly.    Today's PHQ-9         PHQ-9 Total Score: 4    PHQ-9 Q9 Thoughts of better off dead/self-harm past 2 weeks :   Not at all    How difficult have these problems made it for you to do your work, take care of things at home, or get along with other people: Not difficult at all  Today's LYSSA-7 Score: 5             Review of Systems   Constitutional, HEENT, cardiovascular, pulmonary, gi and gu systems are negative, except as otherwise noted.      Objective           Vitals:  No vitals were obtained today due to virtual visit.    Physical Exam   GENERAL: Healthy, alert and no distress  EYES: Eyes grossly normal to inspection.  No discharge or erythema, or obvious scleral/conjunctival abnormalities.  NEURO: Cranial nerves grossly intact.  Mentation and speech appropriate for age.  PSYCH: Mentation appears normal, affect normal/bright, judgement and insight intact, normal speech and appearance well-groomed.                Video-Visit Details    Type of service:  Video Visit     Originating Location (pt. Location): Home    Distant Location (provider location):  On-site  Platform used for Video Visit: CertiRx     transplant.  He was previously followed in the pediatric pulmonary clinic, and now he is switched to adult.    He feels well from a breathing perspective.  No dyspnea, cough, chest tightness or wheeze.  He works in construction.  He is not on any inhalers.             Past Medical and Surgical History:     Past Medical History:   Diagnosis Date     Allergic rhinitis      Aphthous stomatitis      Fanconi's anemia (H)     aplastic anemia     Fusarium infection (H)      Hypomagnesemia      Oral ulcer      Purpura      Past Surgical History:   Procedure Laterality Date     BONE MARROW BIOPSY       BONE MARROW BIOPSY, BONE SPECIMEN, NEEDLE/TROCAR Right 7/24/2018    Procedure: BIOPSY BONE MARROW;  Bone marrow biopsy;  Surgeon: Sharon Roman NP;  [facility]      BONE MARROW BIOPSY, BONE SPECIMEN, NEEDLE/TROCAR Right 6/4/2019    Procedure: BIOPSY, BONE MARROW;  Surgeon: Albaro Wilkins PA-C;  Location: [facility]      BONE MARROW BIOPSY, BONE SPECIMEN, NEEDLE/TROCAR Left 7/19/2019    Procedure: BIOPSY, BONE MARROW, suture removal on right calf;  Surgeon: Sharon Rooney NP;  [facility]      BONE MARROW BIOPSY, BONE SPECIMEN, NEEDLE/TROCAR N/A 8/5/2019    Procedure: Bone marrow biopsy;  Surgeon: Sharon Rooney NP;  [facility]      BONE MARROW BIOPSY, BONE SPECIMEN, NEEDLE/TROCAR Right 11/22/2019    Procedure: Bone marrow biopsy;  Surgeon: Dayna Bean NP;  [facility]      BONE MARROW BIOPSY, BONE SPECIMEN, NEEDLE/TROCAR N/A 2/4/2020    Procedure: Bone marrow biopsy;  Surgeon: Felicia Escobar PA-C;  [facility]      BONE MARROW BIOPSY, BONE SPECIMEN, NEEDLE/TROCAR Right 7/28/2020    Procedure: Bone marrow biopsy;  Surgeon: Dayna Bean NP;  [facility]      BONE MARROW BIOPSY, BONE SPECIMEN, NEEDLE/TROCAR N/A 7/9/2021    Procedure: BONE MARROW BIOPSY;  Surgeon: Vivien Blevins APRN CNP;  [facility]      BRONCHOSCOPY (RIGID OR FLEXIBLE), DIAGNOSTIC N/A 8/29/2019    Procedure: Flexible Bronchoscopy With Lavage;  Surgeon: Saud Loya MD;  [facility]     COLONOSCOPY N/A 7/9/2021    Procedure: COLONOSCOPY, WITH BIOPSIES,;  Surgeon: Klever Sarkar MD;  [facility]     COLONOSCOPY N/A 6/21/2022    Procedure: COLONOSCOPY, WITH POLYPECTOMY;  Surgeon: Ehsan Staples DO;  [facility]     COLONOSCOPY N/A 6/26/2023    Procedure: Colonoscopy;  Surgeon: Ehsan Staples DO;  Location: [facility]     ESOPHAGOSCOPY, GASTROSCOPY, DUODENOSCOPY (EGD), COMBINED N/A 7/12/2019    Procedure: Upper endocopy with biopsy and Flexsigmoidoscopy with biopsy;  Surgeon: Yaritza Kwon MD;  [facility]      ESOPHAGOSCOPY, GASTROSCOPY, DUODENOSCOPY (EGD), COMBINED N/A 7/9/2021    Procedure: ESOPHAGOGASTRODUODENOSCOPY (EGD), WITH BIOPSIES,;  Surgeon: Klever Sarkar MD;  [facility]     ESOPHAGOSCOPY, GASTROSCOPY, DUODENOSCOPY (EGD), COMBINED N/A 6/21/2022    Procedure: ESOPHAGOGASTRODUODENOSCOPY, WITH BIOPSY;  Surgeon: Ehsan Staples DO;  [facility]     ESOPHAGOSCOPY, GASTROSCOPY, DUODENOSCOPY (EGD), COMBINED N/A 6/26/2023    Procedure: Esophagoscopy, gastroscopy, duodenoscopy (EGD), combined;  Surgeon: Ehsan Staples DO;  Location: [facility]     ESOPHAGOSCOPY, GASTROSCOPY, DUODENOSCOPY (EGD), COMBINED N/A 5/23/2024    Procedure: Esophagoscopy, gastroscopy, duodenoscopy (EGD), combined;  Surgeon: Ehsan Staples DO;  Location: [facility]     INSERT CATHETER VASCULAR ACCESS N/A 6/4/2019    Procedure: INSERTION, VASCULAR ACCESS CATHETER;  Surgeon: Nicole Jones PA-C;  [facility]      INSERT CATHETER VASCULAR ACCESS N/A 8/12/2019    Procedure: Non-tunneled fritz line placement;  Surgeon: Nicole Jones PA-C;  [facility]      INSERT PICC LINE N/A 8/29/2019    Procedure: Picc Line Insertion;  Surgeon: Kimani Chávez PA-C;  [facility]     IR CVC TUNNEL  PLACEMENT > 5 YRS OF AGE  6/4/2019     IR CVC TUNNEL PLACEMENT > 5 YRS OF AGE  8/12/2019     IR CVC TUNNEL REMOVAL LEFT  11/22/2019     IR CVC TUNNEL REMOVAL RIGHT  8/9/2019     IR PICC PLACEMENT > 5 YRS OF AGE  8/29/2019     REMOVE CATHETER VASCULAR ACCESS N/A 8/9/2019    Procedure: Tunneled line removal;  Surgeon: Nicole Jones PA-C;  [facility]      REMOVE CATHETER VASCULAR ACCESS  11/22/2019    Procedure: tunneled line removal;  Surgeon: Yang Huffman MD;  [facility]      SIGMOIDOSCOPY FLEXIBLE N/A 7/12/2019    Procedure: Flexible sigmoidoscopy with biopsy;  Surgeon: Yaritza Kwon MD;  [facility]      TRANSPLANT      stem cell transplant X2           Family History:     Family History   Problem Relation Age of Onset     Celiac Disease No family hx of      Crohn's Disease No family hx of      Ulcerative Colitis No family hx of               Social History:     Social History     Socioeconomic History     Marital status: Single     Spouse name: Not on file     Number of children: Not on file     Years of education: Not on file     Highest education level: Not on file   Occupational History     Not on file   Tobacco Use     Smoking status: Never     Passive exposure: Never     Smokeless tobacco: Never   Substance and Sexual Activity     Alcohol use: No     Drug use: No     Sexual activity: Not on file   Other Topics Concern     Parent/sibling w/ CABG, MI or angioplasty before 65F 55M? Not Asked   Social History Narrative     Not on file     Social Drivers of Health     Financial Resource Strain: Not on file   Food Insecurity: No Food Insecurity (6/28/2023)    Hunger Vital Sign      Worried About Running Out of Food in the Last Year: Never true      Ran Out of Food in the Last Year: Never true   Transportation Needs: Not on file   Physical Activity: Not on file   Stress: Not on file   Social Connections: Not on file   Interpersonal Safety: Low Risk   (3/14/2023)    Received from [facility]    Interpersonal Safety      Feels UN-safe at Home or Work/School: no   Housing Stability: Not on file            Medications:     Current Outpatient Medications   Medication Sig Dispense Refill     albuterol (PROAIR HFA/PROVENTIL HFA/VENTOLIN HFA) 108 (90 Base) MCG/ACT inhaler Inhale 2 puffs into the lungs every 6 hours as needed for shortness of breath, wheezing or cough 18 g 11     ALPRAZolam (XANAX) 0.25 MG ODT Take 0.25 mg by mouth 3 times daily as needed Anxiety       azelastine (ASTELIN) 0.1 % nasal spray Spray 2 sprays into both nostrils 2 times daily PRN 30 mL 11     cetirizine (ZYRTEC) 10 MG tablet Take 10 mg by mouth daily dispersible lingual PO daily       polyethylene glycol (GOLYTELY) 236 g suspension Two days before procedure at 5PM fill first container with water. Mix and drink an 8 oz glass every 15 minutes until HALF of the container is gone. Place the remainder in the refrigerator. One day before procedure at 5PM drink second half of bowel prep. Drink an 8 oz glass every 15 minutes until it is gone. Day of procedure 6 hours before arrival time fill the 2nd container with water. Mix and drink an 8 oz glass every 15 minutes until HALF of the container is gone. Discard the remaining solution. 8000 mL 0     pseudoePHEDrine-guaiFENesin (MUCINEX D)  MG 12 hr tablet Take 1 tablet by mouth every 12 hours PRN       traZODone (DESYREL) 50 MG tablet Take 50 mg by mouth At Bedtime       venlafaxine (EFFEXOR XR) 75 MG 24 hr capsule Take 225 mg by mouth daily       zinc gluconate 50 MG tablet Take 50 mg by mouth daily       bisacodyl (DULCOLAX) 5 MG EC tablet Two days prior to exam take two (2) tablets at 4pm. One day prior to exam take two (2) tablets at 4pm (Patient not taking: Reported on 6/2/2025) 4 tablet 0     vitamin C (ASCORBIC ACID) 1000 MG TABS Take 1,000 mg by mouth daily (Patient not taking: Reported on 6/2/2025)       Vitamin D  "(Cholecalciferol) 25 MCG (1000 UT) TABS  (Patient not taking: Reported on 6/2/2025)       Current Facility-Administered Medications   Medication Dose Route Frequency Provider Last Rate Last Admin     methacholine (PROVOCHOLINE) neb solution 100 mg  100 mg Inhalation Once Saud Loya MD         Facility-Administered Medications Ordered in Other Visits   Medication Dose Route Frequency Provider Last Rate Last Admin     lactated ringers infusion   Intravenous Continuous PRN Lisset Smith APRN CRNA   New Bag at 02/04/20 1143     lidocaine 2% injection (MDV)   Intravenous PRN Lisset Smith APRN CRNA   50 mg at 02/04/20 1143     PRE OP antibiotics NOT needed for this surgical procedure    PRLisset Rothman APRN CRNA   1 each at 02/04/20 1143     propofol (DIPRIVAN) injection 10 mg/mL vial   Intravenous PRN Lisset Smith APRN CRNA   40 mg at 02/04/20 1153     propofol (DIPRIVAN) injection 10 mg/mL vial   Intravenous Continuous PRN Lisset Smith APRN CRNA 81.5 mL/hr at 02/04/20 1018 300 mcg/kg/min at 02/04/20 1018            Review of Systems:       A complete review of systems was otherwise negative except as noted in the HPI.      PHYSICAL EXAM:  /65 (BP Location: Right arm, Patient Position: Chair, Cuff Size: Adult Regular)   Pulse 68   Ht 1.676 m (5' 6\")   Wt 66.2 kg (146 lb)   SpO2 100%   BMI 23.57 kg/m       General: Comfortable, No apparent distress  Eyes: Anicteric  Ears: Hearing grossly normal  Neck: supple, no thyromegaly  Lymphatics: No cervical or supraclavicular nodes  Respiratory: Good air movement. No crackles. No rhonchi. No wheezes  Cardiac: RRR, normal S1, S2. No murmurs.   Musculoskeletal: Extremities normal. No clubbing. No cyanosis. No edema.  Skin: No rash on limited exam  Neuro: Normal mentation. Normal speech.  Psych:Normal affect           Data:   All laboratory and imaging data reviewed.      PFT:       PFT Interpretation:  No airflow obstruction mild " restriction based on a slightly low total lung capacity.  Normal diffusion capacity.  Valid Maneuver    Chest CT: I have reviewed the chest CT images from May 21, 2024 and agree with the radiologist interpretation below:  Lungs: Central airways are patent. Slight interval enlargement of left  apical cavity now measuring 3.1 x 3.9 cm, previously 3.7 x 2.9 cm when  measured similarly. Unchanged 1 to 2 mm of wall thickness. No evidence  of mycetoma/aspergilloma. There is continued free communication with  the bronchial tree. Similar bronchial wall thickening just proximal to  the cystic lesion. There are a few sub-6 mm scattered solid nodules,  none change compared to prior, for example 4 mm groundglass nodule in  the anterior right upper lobe (3/46), previously measured the same. No  suspicious pulmonary nodules or masses. No focal airspace  consolidation. No pleural effusions. No pneumothorax.     Mediastinum: Thyroid appears unremarkable. Stable prominent  mediastinal lymph nodes. No enlarged axillary or mediastinal lymph  nodes by short axis criteria. The heart is not enlarged. No  pericardial effusion. The esophagus appears unremarkable.     Upper abdomen: Visualized portions of the upper abdomen are  unremarkable.     Bones/soft tissue: No acute or suspicious osseous abnormality.                                                                         IMPRESSION:  1. Slight interval increase in size of left apical cavity. No evidence  of internal debris to suggest mycetoma/aspergilloma.  2. Multiple stable tiny pulmonary nodules.      Recent Results (from the past week)   General PFT Lab (Please always keep checked)    Collection Time: 06/02/25 10:29 AM   Result Value Ref Range    FVC-Pred 4.50 L    FVC-Pre 4.31 L    FVC-%Pred-Pre 95 %    FEV1-Pre 3.94 L    FEV1-%Pred-Pre 102 %    FEV1FVC-Pred 86 %    FEV1FVC-Pre 91 %    FEFMax-Pred 9.40 L/sec    FEFMax-Pre 9.36 L/sec    FEFMax-%Pred-Pre 99 %    FEF2575-Pred 4.25  L/sec    FEF2575-Pre 4.78 L/sec    PNE9041-%Pred-Pre 112 %    ExpTime-Pre 6.26 sec    FIFMax-Pre 5.54 L/sec    VC-Pred 5.10 L    VC-Pre 4.04 L    VC-%Pred-Pre 79 %    IC-Pred 3.28 L    IC-Pre 2.52 L    IC-%Pred-Pre 76 %    ERV-Pred 1.64 L    ERV-Pre 1.52 L    ERV-%Pred-Pre 92 %    FEV1FEV6-Pred 84 %    FEV1FEV6-Pre 91 %    FRCPleth-Pred 2.89 L    FRCPleth-Pre 2.57 L    FRCPleth-%Pred-Pre 88 %    RVPleth-Pred 1.53 L    RVPleth-Pre 1.05 L    RVPleth-%Pred-Pre 68 %    TLCPleth-Pred 6.40 L    TLCPleth-Pre 5.09 L    TLCPleth-%Pred-Pre 79 %    DLCOunc-Pred 30.02 ml/min/mmHg    DLCOunc-Pre 25.56 ml/min/mmHg    DLCOunc-%Pred-Pre 85 %    VA-Pre 5.16 L    VA-%Pred-Pre 90 %    FEV1SVC-Pred 75 %    FEV1SVC-Pre 98 %                                           Again, thank you for allowing me to participate in the care of your patient.        Sincerely,        Harry Kumar MD    Electronically signed

## 2025-07-12 ENCOUNTER — HEALTH MAINTENANCE LETTER (OUTPATIENT)
Age: 25
End: 2025-07-12